# Patient Record
Sex: MALE | Race: WHITE | NOT HISPANIC OR LATINO | Employment: OTHER | URBAN - METROPOLITAN AREA
[De-identification: names, ages, dates, MRNs, and addresses within clinical notes are randomized per-mention and may not be internally consistent; named-entity substitution may affect disease eponyms.]

---

## 2019-03-11 ENCOUNTER — TELEPHONE (OUTPATIENT)
Dept: NEUROLOGY | Facility: CLINIC | Age: 79
End: 2019-03-11

## 2019-03-11 DIAGNOSIS — M54.16 LUMBAR RADICULOPATHY: Primary | ICD-10-CM

## 2019-03-15 ENCOUNTER — PROCEDURE VISIT (OUTPATIENT)
Dept: NEUROLOGY | Facility: CLINIC | Age: 79
End: 2019-03-15
Payer: MEDICARE

## 2019-03-15 DIAGNOSIS — M54.16 LUMBAR RADICULOPATHY: ICD-10-CM

## 2019-03-15 PROCEDURE — 95910 NRV CNDJ TEST 7-8 STUDIES: CPT | Mod: S$GLB,,, | Performed by: PSYCHIATRY & NEUROLOGY

## 2019-03-15 PROCEDURE — 95886 PR EMG COMPLETE, W/ NERVE CONDUCTION STUDIES, 5+ MUSCLES: ICD-10-PCS | Mod: S$GLB,,, | Performed by: PSYCHIATRY & NEUROLOGY

## 2019-03-15 PROCEDURE — 95910 PR NERVE CONDUCTION STUDY; 7-8 STUDIES: ICD-10-PCS | Mod: S$GLB,,, | Performed by: PSYCHIATRY & NEUROLOGY

## 2019-03-15 PROCEDURE — 95886 MUSC TEST DONE W/N TEST COMP: CPT | Mod: S$GLB,,, | Performed by: PSYCHIATRY & NEUROLOGY

## 2019-05-17 ENCOUNTER — TELEPHONE (OUTPATIENT)
Dept: PAIN MEDICINE | Facility: CLINIC | Age: 79
End: 2019-05-17

## 2019-05-17 NOTE — TELEPHONE ENCOUNTER
Called to confirm appointment with Dr. Shell on 5/23/19 @ 11:00 in pain management clinic.          Pt confirmed. Informed pt of IMP. Verified pt insurance. Informed pt of location and suite number.  Patient gave verbal understanding

## 2019-05-23 ENCOUNTER — HOSPITAL ENCOUNTER (OUTPATIENT)
Dept: RADIOLOGY | Facility: OTHER | Age: 79
Discharge: HOME | End: 2019-05-23
Attending: ANESTHESIOLOGY
Payer: MEDICARE

## 2019-05-23 ENCOUNTER — OFFICE VISIT (OUTPATIENT)
Dept: PAIN MEDICINE | Facility: CLINIC | Age: 79
End: 2019-05-23
Attending: ANESTHESIOLOGY
Payer: MEDICARE

## 2019-05-23 VITALS
BODY MASS INDEX: 31.39 KG/M2 | HEART RATE: 75 BPM | HEIGHT: 67 IN | WEIGHT: 200 LBS | RESPIRATION RATE: 18 BRPM | SYSTOLIC BLOOD PRESSURE: 114 MMHG | DIASTOLIC BLOOD PRESSURE: 75 MMHG | TEMPERATURE: 98 F

## 2019-05-23 DIAGNOSIS — M43.06 SPONDYLOLYSIS OF LUMBAR REGION: ICD-10-CM

## 2019-05-23 DIAGNOSIS — M54.15 RADICULOPATHY OF THORACOLUMBAR REGION: ICD-10-CM

## 2019-05-23 DIAGNOSIS — M51.36 DDD (DEGENERATIVE DISC DISEASE), LUMBAR: ICD-10-CM

## 2019-05-23 DIAGNOSIS — I25.10 CORONARY ARTERY DISEASE INVOLVING NATIVE HEART WITHOUT ANGINA PECTORIS, UNSPECIFIED VESSEL OR LESION TYPE: Chronic | ICD-10-CM

## 2019-05-23 DIAGNOSIS — M48.061 SPINAL STENOSIS OF LUMBAR REGION WITHOUT NEUROGENIC CLAUDICATION: ICD-10-CM

## 2019-05-23 DIAGNOSIS — E11.9 TYPE 2 DIABETES MELLITUS WITHOUT COMPLICATION, WITHOUT LONG-TERM CURRENT USE OF INSULIN: Chronic | ICD-10-CM

## 2019-05-23 DIAGNOSIS — M54.15 RADICULOPATHY OF THORACOLUMBAR REGION: Primary | ICD-10-CM

## 2019-05-23 PROBLEM — E78.5 HYPERLIPEMIA: Chronic | Status: ACTIVE | Noted: 2019-05-23

## 2019-05-23 PROBLEM — Z95.1 S/P CABG X 3: Status: ACTIVE | Noted: 2019-05-23

## 2019-05-23 PROBLEM — I10 ESSENTIAL HYPERTENSION: Chronic | Status: ACTIVE | Noted: 2019-05-23

## 2019-05-23 PROBLEM — I73.9 PERIPHERAL VASCULAR DISEASE: Status: ACTIVE | Noted: 2019-05-23

## 2019-05-23 PROBLEM — T82.211A: Status: ACTIVE | Noted: 2019-05-23

## 2019-05-23 PROCEDURE — 1101F PR PT FALLS ASSESS DOC 0-1 FALLS W/OUT INJ PAST YR: ICD-10-PCS | Mod: CPTII,S$GLB,, | Performed by: ANESTHESIOLOGY

## 2019-05-23 PROCEDURE — 99999 PR PBB SHADOW E&M-EST. PATIENT-LVL IV: ICD-10-PCS | Mod: PBBFAC,,, | Performed by: ANESTHESIOLOGY

## 2019-05-23 PROCEDURE — 99999 PR PBB SHADOW E&M-EST. PATIENT-LVL IV: CPT | Mod: PBBFAC,,, | Performed by: ANESTHESIOLOGY

## 2019-05-23 PROCEDURE — 1101F PT FALLS ASSESS-DOCD LE1/YR: CPT | Mod: CPTII,S$GLB,, | Performed by: ANESTHESIOLOGY

## 2019-05-23 PROCEDURE — 99204 OFFICE O/P NEW MOD 45 MIN: CPT | Mod: GC,S$GLB,, | Performed by: ANESTHESIOLOGY

## 2019-05-23 PROCEDURE — 99204 PR OFFICE/OUTPT VISIT, NEW, LEVL IV, 45-59 MIN: ICD-10-PCS | Mod: GC,S$GLB,, | Performed by: ANESTHESIOLOGY

## 2019-05-23 RX ORDER — TRAMADOL HYDROCHLORIDE 50 MG/1
TABLET ORAL
COMMUNITY
End: 2024-03-25

## 2019-05-23 RX ORDER — GLIMEPIRIDE 2 MG/1
1 TABLET ORAL
COMMUNITY
Start: 2019-02-04

## 2019-05-23 RX ORDER — EPLERENONE 25 MG/1
TABLET, FILM COATED ORAL
COMMUNITY
Start: 2019-02-04 | End: 2019-08-12 | Stop reason: SDUPTHER

## 2019-05-23 RX ORDER — LISINOPRIL 10 MG/1
TABLET ORAL
COMMUNITY
End: 2019-07-03

## 2019-05-23 RX ORDER — METOPROLOL TARTRATE 50 MG/1
TABLET ORAL
COMMUNITY
End: 2019-09-23 | Stop reason: DRUGHIGH

## 2019-05-23 RX ORDER — ASPIRIN 81 MG/1
81 TABLET ORAL
COMMUNITY
Start: 2011-01-06 | End: 2022-03-29

## 2019-05-23 RX ORDER — GABAPENTIN 300 MG/1
CAPSULE ORAL
COMMUNITY
End: 2023-04-06

## 2019-05-23 RX ORDER — FUROSEMIDE 40 MG/1
40 TABLET ORAL DAILY
COMMUNITY
End: 2024-03-25

## 2019-05-23 RX ORDER — LISINOPRIL 10 MG/1
10 TABLET ORAL
COMMUNITY
Start: 2007-10-05 | End: 2019-07-03

## 2019-05-23 RX ORDER — FLUTICASONE PROPIONATE 50 MCG
SPRAY, SUSPENSION (ML) NASAL
COMMUNITY
Start: 2017-07-17

## 2019-05-23 RX ORDER — EPLERENONE 25 MG/1
TABLET, FILM COATED ORAL
COMMUNITY
Start: 2019-04-21 | End: 2024-03-25

## 2019-05-23 RX ORDER — CLOPIDOGREL BISULFATE 75 MG/1
TABLET ORAL
COMMUNITY
Start: 2015-07-16

## 2019-05-23 RX ORDER — PRAVASTATIN SODIUM 80 MG/1
TABLET ORAL
COMMUNITY
Start: 2015-07-13 | End: 2019-09-23 | Stop reason: DRUGHIGH

## 2019-05-23 RX ORDER — POTASSIUM CHLORIDE 1500 MG/1
TABLET, EXTENDED RELEASE ORAL
COMMUNITY
Start: 2019-03-29 | End: 2019-07-03

## 2019-05-23 NOTE — PROGRESS NOTES
Chronic Pain - New Consult    Referring Physician: Yosef Connors     Chief Complaint:   Chief Complaint   Patient presents with    Low-back Pain    Hip Pain     bilateral        SUBJECTIVE:    HPI:  Will Kan presents to the clinic for the evaluation of lower back pain. The pain started 3 years ago following non-related imjury and symptoms have been worsening. More recently patient underwent angiogram in 02/2019 and the back pain got acutely worse after laying flat for the required amount of time. The pain is located in the lower back area and radiates to the bilateral hip, worse on the left. The pain is described as aching and numbing and is rated as 4/10. The pain is rated with a score of  4/10 on the BEST day and a score of 10/10 on the WORST day. Symptoms interfere with activities around the house such as yard work as well as exercise. The pain is exacerbated by bending, walking and getting out of bed/chair. The pain is mitigated by medications and rest. He reports spending 4 hours per day reclining. The patient reports 8 hours of uninterrupted sleep per night.    Patient denies night fever/night sweats, urinary incontinence, bowel incontinence, significant weight loss, significant motor weakness and loss of sensations.    Brief Hx:  Will Kan is a 78 y.o. male w/ a significant PMHx of HTN, HLD, PVD, CAD s/p 3v CABG in 2007, and T2DM. Patient presents to clinic for lower back pain that radiates down to the hip and is worse on the left side. On the left side pain shoots down the leg and stops at the knee.     Physical Therapy/Home Exercise: yes      Pain Disability Index Review:  Last 3 PDI Scores 5/23/2019   Pain Disability Index (PDI) 13       Pain Medications:  - Opioids: Ultram ER (Tramadol HCL)  - Adjuvant Medications: Neurontin (Gabapentin)  - Anti-Coagulants: Aspirin  - Others: see med list      report:  Reviewed and consistent with medication use as prescribed.    Pain Procedures:    None     Imaging:     EMG (03/15/19):  Acute on chronic denervation in bilateral L5 and/or S1 myotomes and chronic denervation in bilateral L4 and/or L5 through S1 and/or S2 myotomes consistent with radiculopathies at those levels.       No past medical history on file.  No past surgical history on file.  Social History     Socioeconomic History    Marital status:      Spouse name: Not on file    Number of children: Not on file    Years of education: Not on file    Highest education level: Not on file   Occupational History    Not on file   Social Needs    Financial resource strain: Not on file    Food insecurity:     Worry: Not on file     Inability: Not on file    Transportation needs:     Medical: Not on file     Non-medical: Not on file   Tobacco Use    Smoking status: Former Smoker    Smokeless tobacco: Never Used    Tobacco comment: 20 years ago   Substance and Sexual Activity    Alcohol use: Not on file    Drug use: Not on file    Sexual activity: Not on file   Lifestyle    Physical activity:     Days per week: Not on file     Minutes per session: Not on file    Stress: Not on file   Relationships    Social connections:     Talks on phone: Not on file     Gets together: Not on file     Attends [employer] service: Not on file     Active member of club or organization: Not on file     Attends meetings of clubs or organizations: Not on file     Relationship status: Not on file   Other Topics Concern    Not on file   Social History Narrative    Not on file     No family history on file.    Review of patient's allergies indicates:  No Known Allergies    Current Outpatient Medications   Medication Sig    aspirin (ASPIR-81) 81 MG EC tablet Take 81 mg by mouth.    clopidogrel (PLAVIX) 75 mg tablet clopidogrel 75 mg tablet    fluticasone propionate (FLONASE) 50 mcg/actuation nasal spray fluticasone propionate 50 mcg/actuation nasal spray,suspension    furosemide (LASIX) 40 MG tablet Take  "40 mg by mouth 2 (two) times daily.    glimepiride (AMARYL) 2 MG tablet glimepiride 2 mg tablet    pravastatin (PRAVACHOL) 80 MG tablet pravastatin 80 mg tablet    eplerenone (INSPRA) 25 MG Tab     eplerenone (INSPRA) 25 MG Tab eplerenone 25 mg tablet    gabapentin (NEURONTIN) 300 MG capsule gabapentin 300 mg capsule   TAKE 1 CAPSULE BY MOUTH THREE TIMES A DAY AS NEEDED    KLOR-CON M20 20 mEq tablet     lisinopril 10 MG tablet Take 10 mg by mouth.    lisinopril 10 MG tablet lisinopril 10 mg tablet    metoprolol tartrate (LOPRESSOR) 50 MG tablet metoprolol tartrate 50 mg tablet    traMADol (ULTRAM) 50 mg tablet tramadol 50 mg tablet     No current facility-administered medications for this visit.        REVIEW OF SYSTEMS:  GENERAL: No weight loss, malaise or fevers.  HEENT: Negative for frequent or significant headaches.  NECK: Negative for lumps, goiter, pain and significant neck swelling.  RESPIRATORY: Negative for cough, wheezing or shortness of breath.  CARDIOVASCULAR: Negative for chest pain, leg swelling or palpitations.  GI: Negative for abdominal discomfort, blood in stools or black stools or change in bowel habits.  MUSCULOSKELETAL: See HPI.  SKIN: Negative for lesions, rash, and itching.  PSYCH: +ve for sleep disturbance, mood disorder and recent psychosocial stressors.  HEMATOLOGY/LYMPHOLOGY: Negative for prolonged bleeding, bruising easily or swollen nodes.  NEURO: No history of headaches, syncope, paralysis, seizures or tremors.  All other reviewed and negative other than HPI.    OBJECTIVE:    /75   Pulse 75   Temp 97.6 °F (36.4 °C)   Resp 18   Ht 5' 7" (1.702 m)   Wt 90.7 kg (200 lb)   BMI 31.32 kg/m²     PHYSICAL EXAMINATION:  General appearance: Well appearing, in no acute distress, alert and oriented x3.  Psych:  Mood and affect appropriate.  Skin: Skin color, texture, turgor normal, no rashes or lesions, in both upper and lower body.  Head/face:  Normocephalic, atraumatic. No " palpable lymph nodes.  Neck: No pain to palpation over the cervical paraspinous muscles. Spurling Negative. No pain with neck flexion, extension, or lateral flexion.   Cor: RRR  Pulm: CTA  GI: Soft and non-tender.  Back: Straight leg raising in the sitting and supine positions is positive to radicular pain in left lower extremity. Positive facet loading on left side. Moderate pain to palpation over the spine or costovertebral angles. Normal range of motion without pain reproduction.  Extremities: Peripheral joint ROM is full and pain free without obvious instability or laxity in all four extremities. No deformities, edema, or skin discoloration. Good capillary refill.  Musculoskeletal: Shoulder, hip, sacroiliac and knee provocative maneuvers are negative. Bilateral upper and lower extremity strength is normal and symmetric. No atrophy or tone abnormalities are noted.  Neuro: Bilateral upper and lower extremity coordination and muscle stretch reflexes are physiologic and symmetric. Plantar response are downgoing. No loss of sensation is noted.  Gait: antalgic    ASSESSMENT: 78 y.o. year old male with pain, consistent with     1. Lumbar degenerative disc disease     2. Essential hypertension     3. Coronary artery disease involving native heart without angina pectoris, unspecified vessel or lesion type     4. Hyperlipidemia, unspecified hyperlipidemia type     5. Type 2 diabetes mellitus without complication, without long-term current use of insulin     6. S/P CABG x 3     7. Peripheral vascular disease     8. Spinal stenosis of lumbar region without neurogenic claudication     9. DDD (degenerative disc disease), lumbar     10. Spondylolysis of lumbar region         PLAN:     - I have stressed the importance of physical activity and a home exercise plan to help with pain and improve health.  - Start Neurontin 300 mg gradually to three times a day to help with radicular pain.  -schedule for TFESI L3-4  - RTC in 2 weeks  or sooner if needed.  - Counseled patient regarding the importance of activity modification, constant sleeping habits and physical therapy.    The above plan and management options were discussed at length with patient. Patient is in agreement with the above and verbalized understanding. It will be communicated with the referring physician via electronic record, fax, or mail.    Simon Larson I have personally reviewed the history and exam of this patient and agree with the resident/fellow/NPs note as stated above.    Jason Shell MD    05/23/2019

## 2019-05-24 ENCOUNTER — HOSPITAL ENCOUNTER (OUTPATIENT)
Dept: RADIOLOGY | Facility: HOSPITAL | Age: 79
Discharge: HOME | End: 2019-05-24
Attending: ANESTHESIOLOGY
Payer: MEDICARE

## 2019-05-24 PROCEDURE — 72110 X-RAY EXAM L-2 SPINE 4/>VWS: CPT | Mod: 26,,, | Performed by: RADIOLOGY

## 2019-05-24 PROCEDURE — 72110 XR LUMBAR SPINE 5 VIEW WITH FLEX AND EXT: ICD-10-PCS | Mod: 26,,, | Performed by: RADIOLOGY

## 2019-05-24 PROCEDURE — 72110 X-RAY EXAM L-2 SPINE 4/>VWS: CPT | Mod: TC,FY

## 2019-05-24 PROCEDURE — 73521 X-RAY EXAM HIPS BI 2 VIEWS: CPT | Mod: TC,FY

## 2019-05-24 PROCEDURE — 73521 X-RAY EXAM HIPS BI 2 VIEWS: CPT | Mod: 26,,, | Performed by: RADIOLOGY

## 2019-05-24 PROCEDURE — 73521 XR HIPS BILATERAL 2 VIEW INCL AP PELVIS: ICD-10-PCS | Mod: 26,,, | Performed by: RADIOLOGY

## 2019-06-03 ENCOUNTER — TELEPHONE (OUTPATIENT)
Dept: PAIN MEDICINE | Facility: CLINIC | Age: 79
End: 2019-06-03

## 2019-06-03 NOTE — TELEPHONE ENCOUNTER
----- Message from Racquel Brizuela sent at 6/3/2019 10:13 AM CDT -----  Pt. Will like a call back to see what's going on regarding his hip Xray, please call pt.

## 2019-06-04 DIAGNOSIS — M48.50XA VERTEBRAL COMPRESSION FRACTURE: Primary | ICD-10-CM

## 2019-06-12 ENCOUNTER — TELEPHONE (OUTPATIENT)
Dept: PAIN MEDICINE | Facility: CLINIC | Age: 79
End: 2019-06-12

## 2019-06-12 ENCOUNTER — PATIENT MESSAGE (OUTPATIENT)
Dept: PAIN MEDICINE | Facility: CLINIC | Age: 79
End: 2019-06-12

## 2019-06-12 NOTE — TELEPHONE ENCOUNTER
----- Message from Taqueria Hector sent at 6/12/2019  1:54 PM CDT -----  Contact: TACOS MYERS [[id number]]  Type:  Patient Returning Call    Who Called: Brittaney Duran MA          Who Left Message for Patient: Brittaney Duran MA          Does the patient know what this is regarding?Procedure    Best Call Back Number:[phone]    Additional Information:

## 2019-06-12 NOTE — TELEPHONE ENCOUNTER
Called pt to follow up in regards to numbness and weakness in foot after injection and to reschedule follow up appt after procedure due to him pushing previous procedure appointment back to 6/19/19.    Pt requested for staff to call him back as he is informed staff he is in the grocery store at the moment.

## 2019-06-12 NOTE — TELEPHONE ENCOUNTER
----- Message from Lucero Garcia RN sent at 6/12/2019 11:28 AM CDT -----  Pt R/S procedure to next week 6/19, but he did want to let Dr Shell know that since his last injection he has been having numbness and weakness in his right foot. He stated if anyone needs to talk to him further about please give him a call. Thanks

## 2019-06-12 NOTE — TELEPHONE ENCOUNTER
Called and spoke with pt regarding him rescheduling his procedure for 6/1/9/19.    Staff explained to pt that follow up visit will need to be rescheduled to two weeks after procedure.     Pt rescheduled to 7/3/19 @ 11:00am with Any Orgeron.     Pt then informed staff that he his experiencing tingling and numbness in his ankle and foot since his last visit. Pt informed it was not related to injury, only cut gras the day before and the pain is primarily at night.     Staff informed pt that regards will be sent to Dr. Shell and will follow up upon his response.       Pt gave verbal understanding.

## 2019-06-19 ENCOUNTER — HOSPITAL ENCOUNTER (OUTPATIENT)
Facility: OTHER | Age: 79
Discharge: HOME | End: 2019-06-19
Attending: ANESTHESIOLOGY | Admitting: ANESTHESIOLOGY
Payer: MEDICARE

## 2019-06-19 VITALS
HEART RATE: 72 BPM | SYSTOLIC BLOOD PRESSURE: 142 MMHG | HEIGHT: 67 IN | DIASTOLIC BLOOD PRESSURE: 61 MMHG | WEIGHT: 196 LBS | OXYGEN SATURATION: 96 % | BODY MASS INDEX: 30.76 KG/M2 | TEMPERATURE: 98 F | RESPIRATION RATE: 18 BRPM

## 2019-06-19 DIAGNOSIS — M51.36 LUMBAR DEGENERATIVE DISC DISEASE: Primary | ICD-10-CM

## 2019-06-19 DIAGNOSIS — G89.29 CHRONIC PAIN: ICD-10-CM

## 2019-06-19 LAB — POCT GLUCOSE: 100 MG/DL (ref 70–110)

## 2019-06-19 PROCEDURE — 64483 NJX AA&/STRD TFRM EPI L/S 1: CPT | Mod: 50,,, | Performed by: ANESTHESIOLOGY

## 2019-06-19 PROCEDURE — 25500020 PHARM REV CODE 255: Performed by: ANESTHESIOLOGY

## 2019-06-19 PROCEDURE — 63600175 PHARM REV CODE 636 W HCPCS: Performed by: ANESTHESIOLOGY

## 2019-06-19 PROCEDURE — 25000003 PHARM REV CODE 250: Performed by: STUDENT IN AN ORGANIZED HEALTH CARE EDUCATION/TRAINING PROGRAM

## 2019-06-19 PROCEDURE — 25000003 PHARM REV CODE 250: Performed by: ANESTHESIOLOGY

## 2019-06-19 PROCEDURE — 99152 MOD SED SAME PHYS/QHP 5/>YRS: CPT | Mod: ,,, | Performed by: ANESTHESIOLOGY

## 2019-06-19 PROCEDURE — 64483 NJX AA&/STRD TFRM EPI L/S 1: CPT | Mod: 50 | Performed by: ANESTHESIOLOGY

## 2019-06-19 PROCEDURE — 64483 PR EPIDURAL INJ, ANES/STEROID, TRANSFORAMINAL, LUMB/SACR, SNGL LEVL: ICD-10-PCS | Mod: 50,,, | Performed by: ANESTHESIOLOGY

## 2019-06-19 PROCEDURE — 99152 PR MOD CONSCIOUS SEDATION, SAME PHYS, 5+ YRS, FIRST 15 MIN: ICD-10-PCS | Mod: ,,, | Performed by: ANESTHESIOLOGY

## 2019-06-19 RX ORDER — MIDAZOLAM HYDROCHLORIDE 1 MG/ML
INJECTION INTRAMUSCULAR; INTRAVENOUS
Status: DISCONTINUED | OUTPATIENT
Start: 2019-06-19 | End: 2019-06-19 | Stop reason: HOSPADM

## 2019-06-19 RX ORDER — LIDOCAINE HYDROCHLORIDE 10 MG/ML
INJECTION INFILTRATION; PERINEURAL
Status: DISCONTINUED | OUTPATIENT
Start: 2019-06-19 | End: 2019-06-19 | Stop reason: HOSPADM

## 2019-06-19 RX ORDER — SODIUM CHLORIDE 9 MG/ML
500 INJECTION, SOLUTION INTRAVENOUS CONTINUOUS
Status: DISCONTINUED | OUTPATIENT
Start: 2019-06-19 | End: 2019-06-19 | Stop reason: HOSPADM

## 2019-06-19 RX ORDER — LIDOCAINE HYDROCHLORIDE 5 MG/ML
INJECTION, SOLUTION INFILTRATION; INTRAVENOUS
Status: DISCONTINUED | OUTPATIENT
Start: 2019-06-19 | End: 2019-06-19 | Stop reason: HOSPADM

## 2019-06-19 RX ORDER — DEXAMETHASONE SODIUM PHOSPHATE 10 MG/ML
INJECTION INTRAMUSCULAR; INTRAVENOUS
Status: DISCONTINUED | OUTPATIENT
Start: 2019-06-19 | End: 2019-06-19 | Stop reason: HOSPADM

## 2019-06-19 RX ORDER — FENTANYL CITRATE 50 UG/ML
INJECTION, SOLUTION INTRAMUSCULAR; INTRAVENOUS
Status: DISCONTINUED | OUTPATIENT
Start: 2019-06-19 | End: 2019-06-19 | Stop reason: HOSPADM

## 2019-06-19 RX ORDER — SODIUM CHLORIDE 9 MG/ML
500 INJECTION, SOLUTION INTRAVENOUS CONTINUOUS
Status: ACTIVE | OUTPATIENT
Start: 2019-06-19

## 2019-06-19 RX ADMIN — SODIUM CHLORIDE 500 ML: 0.9 INJECTION, SOLUTION INTRAVENOUS at 09:06

## 2019-06-19 NOTE — OP NOTE
Patient Name: Will Kan  MRN: [id number]    INFORMED CONSENT: The procedure, risks, benefits and options were discussed with patient. There are no contraindications to the procedure. The patient expressed understanding and agreed to proceed. The personnel performing the procedure was discussed. I verify that I personally obtained Will's consent prior to the start of the procedure and the signed consent can be found on the patient's chart.    Procedure Date: 06/19/2019    Anesthesia: Topical    Pre Procedure diagnosis: Lumbar radiculopathy [M54.16]  1. Lumbar degenerative disc disease    2. Chronic pain      Post-Procedure diagnosis: SAME      Sedation: Yes - Fentanyl 50 mcg and Midazolam 2 mg    PROCEDURE:Bilateral L3-4   TRANSFORAMINAL EPIDURAL STEROID INJECTION    DESCRIPTION OF PROCEDURE: The patient was brought to the procedure room. After performing time out IV access was obtained prior to the procedure. The patient was positioned prone on the fluoroscopy table. Continuous hemodynamic monitoring was initiated including blood pressure, EKG, and pulse oximetry. . The skin was prepped with chlorhexidine three times and draped in a sterile fashion. Skin anesthesia was achieved using 3 mL of lidocaine 1% over the respective injection site.     An oblique fluoroscopic view was obtained, with the superior articular process of the inferior vertebral body aligned with the pedicle. The tip of a 22-gauge 3.5-inch Quincke-type spinal needle was advanced toward the 6 oclock position of the pedicle under intermittent fluoroscopic guidance. Confirmation of proper needle position was made with AP, oblique, and lateral fluoroscopic views. Negative aspiration for blood or CSF was confirmed. 2 mL of Omnipaque 300 was injected. Live fluoroscopic imaging revealed a clear outline of the spinal nerve with proximal spread of agent through the neural foramen into the anterior epidural space. A total combination of 3 mL of  Lidocaine 0.5% and 10 mg decadron was injected at each level. Contrast spread was noted from L2 to L4 level. There was no pain on injection. The needle was removed and bleeding was nil.  A sterile dressing was applied. Will was taken back to the recovery room for further observation.     Blood Loss: Nill  Specimen: None    Jason Shell MD

## 2019-06-19 NOTE — H&P
"HPI  Patient presenting for  Procedure(s) (LRB):  INJECTION, STEROID, EPIDURAL, TRANSFORAMINAL APPROACH, L3-L4 need consent Plavix (Bilateral) Last day of plavix 6/12/19    No health changes since previous encounter    PMHx, PSHx, Allergies, Medications reviewed in epic    ROS negative except pain complaints in HPI    OBJECTIVE:    BP (!) 184/83 (BP Location: Right arm, Patient Position: Sitting)   Pulse 83   Temp 98.3 °F (36.8 °C) (Oral)   Ht 5' 7" (1.702 m)   Wt 88.9 kg (196 lb)   SpO2 98%   BMI 30.70 kg/m²     PHYSICAL EXAMINATION:    GENERAL: Well appearing, in no acute distress, alert and oriented x3.  PSYCH:  Mood and affect appropriate.  SKIN: Skin color, texture, turgor normal, no rashes or lesions.  CV: RRR with palpation of the radial artery.  PULM: No evidence of respiratory difficulty, symmetric chest rise. Clear to auscultation.  NEURO: Cranial nerves grossly intact.    Plan:    Proceed with procedure as planned    Yamilex Schmidt  06/19/2019  "

## 2019-06-19 NOTE — DISCHARGE INSTRUCTIONS
Adult Procedural Sedation Instructions    Recovery After Procedural Sedation (Adult)  You have been given medicine by vein to make you sleep during your surgery. This may have included both a pain medicine and sleeping medicine. Most of the effects have worn off. But you may still have some drowsiness for the next 6 to 8 hours.  Home care  Follow these guidelines when you get home:  · For the next 8 hours, you should be watched by a responsible adult. This person should make sure your condition is not getting worse.  · Don't drink any alcohol for the next 24 hours.  · Don't drive, operate dangerous machinery, or make important business or personal decisions during the next 24 hours.  Note: Your healthcare provider may tell you not to take any medicine by mouth for pain or sleep in the next 4 hours. These medicines may react with the medicines you were given in the hospital. This could cause a much stronger response than usual.  Follow-up care  Follow up with your healthcare provider if you are not alert and back to your usual level of activity within 12 hours.  When to seek medical advice  Call your healthcare provider right away if any of these occur:  · Drowsiness gets worse  · Weakness or dizziness gets worse  · Repeated vomiting  · You can't be awakened   Date Last Reviewed: 10/18/2016  © 4497-4193 [employer]. [address] [place] [address], [place] [address]. All rights reserved. This information is not intended as a substitute for professional medical care. Always follow your healthcare professional's instructions.       Thank you for allowing us to care for you today. You may receive a survey about the care we provided. Your feedback is valuable and helps us provide excellent care throughout the community.     Home Care Instructions for Pain Management:    1. DIET:   You may resume your normal diet today.   2. BATHING:   You may shower with luke warm water. No tub baths or anything that will soak  injection sites under water for the next 24 hours.  3. DRESSING:   You may remove your bandage today.   4. ACTIVITY LEVEL:   You may resume your normal activities 24 hrs after your procedure. Nothing strenuous today.  5. MEDICATIONS:   You may resume your normal medications today. To restart blood thinners, ask your doctor.  6. DRIVING    If you have received any sedatives by mouth today, you may not drive for 12 hours.    If you have received any sedation through your IV, you may not drive for 24 hrs.   7. SPECIAL INSTRUCTIONS:   No heat to the injection site for 24 hrs including, hot bath or shower, heating pad, moist heat, or hot tubs.    Use ice pack to injection site for any pain or discomfort.  Apply ice packs for 20 minute intervals as needed.    IF you have diabetes, be sure to monitor your blood sugar more closely. IF your injection contained steroids your blood sugar levels may become higher than normal.    If you are still having pain upon discharge:  Your pain may improve over the next 48 hours. The anesthetic (numbing medication) works immediately to 48 hours. IF your injection contained a steroid (anti-inflammatory medication), it takes approximately 3 days to start feeling relief and 7-10 days to see your greatest results from the medication. It is possible you may need subsequent injections. This would be discussed at your follow up appointment with pain management or your referring doctor.      PLEASE CALL YOUR DOCTOR IF:  1. Redness or swelling around the injection site.  2. Fever of 101 degrees or more  3. Drainage (pus) from the injection site.  4. For any continuous bleeding (some dried blood over the incision is normal.)    FOR EMERGENCIES:   If any unusual problems or difficulties occur during clinic hours, call [phone] or [phone].

## 2019-06-24 NOTE — DISCHARGE SUMMARY
Discharge Note  Short Stay      SUMMARY     Admit Date: 6/19/2019    Attending Physician: Jason Shell      Discharge Physician: Jason Shell      Discharge Date: 6/19/2019     Procedure(s) (LRB):  INJECTION, STEROID, EPIDURAL, TRANSFORAMINAL APPROACH, L3-L4 need consent Plavix (Bilateral)    Final Diagnosis: Lumbar radiculopathy [M54.16]    Disposition: Home or self care    Patient Instructions:   Discharge Medication List as of 6/19/2019  9:49 AM      CONTINUE these medications which have NOT CHANGED    Details   aspirin (ASPIR-81) 81 MG EC tablet Take 81 mg by mouth., Starting Thu 1/6/2011, Historical Med      clopidogrel (PLAVIX) 75 mg tablet clopidogrel 75 mg tablet, Historical Med      !! eplerenone (INSPRA) 25 MG Tab Starting Sun 4/21/2019, Historical Med      !! eplerenone (INSPRA) 25 MG Tab eplerenone 25 mg tablet, Historical Med      fluticasone propionate (FLONASE) 50 mcg/actuation nasal spray fluticasone propionate 50 mcg/actuation nasal spray,suspension, Historical Med      furosemide (LASIX) 40 MG tablet Take 40 mg by mouth 2 (two) times daily., Historical Med      gabapentin (NEURONTIN) 300 MG capsule gabapentin 300 mg capsule   TAKE 1 CAPSULE BY MOUTH THREE TIMES A DAY AS NEEDED, Historical Med      glimepiride (AMARYL) 2 MG tablet glimepiride 2 mg tablet, Historical Med      KLOR-CON M20 20 mEq tablet Starting Fri 3/29/2019, Historical Med      !! lisinopril 10 MG tablet Take 10 mg by mouth., Starting Fri 10/5/2007, Historical Med      !! lisinopril 10 MG tablet lisinopril 10 mg tablet, Historical Med      metoprolol tartrate (LOPRESSOR) 50 MG tablet metoprolol tartrate 50 mg tablet, Historical Med      pravastatin (PRAVACHOL) 80 MG tablet pravastatin 80 mg tablet, Historical Med      traMADol (ULTRAM) 50 mg tablet tramadol 50 mg tablet, Historical Med       !! - Potential duplicate medications found. Please discuss with provider.              Discharge Diagnosis: Lumbar radiculopathy  [M54.16]  Condition on Discharge: Stable with no complications to procedure   Diet on Discharge: Same as before.  Activity: as per instruction sheet.  Discharge to: Home with a responsible adult.  Follow up: 2-4 weeks

## 2019-07-03 ENCOUNTER — OFFICE VISIT (OUTPATIENT)
Dept: PAIN MEDICINE | Facility: CLINIC | Age: 79
End: 2019-07-03
Payer: MEDICARE

## 2019-07-03 VITALS
TEMPERATURE: 98 F | WEIGHT: 194.69 LBS | HEART RATE: 86 BPM | DIASTOLIC BLOOD PRESSURE: 77 MMHG | BODY MASS INDEX: 30.56 KG/M2 | HEIGHT: 67 IN | SYSTOLIC BLOOD PRESSURE: 140 MMHG

## 2019-07-03 DIAGNOSIS — M48.061 SPINAL STENOSIS OF LUMBAR REGION WITHOUT NEUROGENIC CLAUDICATION: ICD-10-CM

## 2019-07-03 DIAGNOSIS — M47.816 FACET ARTHRITIS OF LUMBAR REGION: ICD-10-CM

## 2019-07-03 DIAGNOSIS — M47.26 OSTEOARTHRITIS OF SPINE WITH RADICULOPATHY, LUMBAR REGION: ICD-10-CM

## 2019-07-03 DIAGNOSIS — M54.16 LUMBAR RADICULOPATHY: Primary | ICD-10-CM

## 2019-07-03 DIAGNOSIS — M51.36 DDD (DEGENERATIVE DISC DISEASE), LUMBAR: ICD-10-CM

## 2019-07-03 PROCEDURE — 99213 PR OFFICE/OUTPT VISIT, EST, LEVL III, 20-29 MIN: ICD-10-PCS | Mod: S$GLB,,, | Performed by: NURSE PRACTITIONER

## 2019-07-03 PROCEDURE — 1101F PR PT FALLS ASSESS DOC 0-1 FALLS W/OUT INJ PAST YR: ICD-10-PCS | Mod: CPTII,S$GLB,, | Performed by: NURSE PRACTITIONER

## 2019-07-03 PROCEDURE — 1101F PT FALLS ASSESS-DOCD LE1/YR: CPT | Mod: CPTII,S$GLB,, | Performed by: NURSE PRACTITIONER

## 2019-07-03 PROCEDURE — 99999 PR PBB SHADOW E&M-EST. PATIENT-LVL III: ICD-10-PCS | Mod: PBBFAC,,, | Performed by: NURSE PRACTITIONER

## 2019-07-03 PROCEDURE — 99999 PR PBB SHADOW E&M-EST. PATIENT-LVL III: CPT | Mod: PBBFAC,,, | Performed by: NURSE PRACTITIONER

## 2019-07-03 PROCEDURE — 99213 OFFICE O/P EST LOW 20 MIN: CPT | Mod: S$GLB,,, | Performed by: NURSE PRACTITIONER

## 2019-07-03 RX ORDER — CILOSTAZOL 100 MG/1
TABLET ORAL
COMMUNITY
Start: 2019-06-24 | End: 2019-08-12

## 2019-07-03 NOTE — PROGRESS NOTES
Chronic Pain - Established Visit    Referring Physician: No ref. provider found     Chief Complaint:   Chief Complaint   Patient presents with    Follow-up     2 week after injection        SUBJECTIVE:    Interval History 7/3/2019:  The patient returns to clinic today for follow up. He is s/p bilateral L3 TF TRIP on 6/19/2019. He reports significant relief of his back pain. He continues to report pain that radiates into the front and side of his left leg to his knee. He denies any right radicular leg pain. He does report burning, tingling, and numbness to the top of his right foot. His pain is worse with prolonged activity and at night. He continues to take Gabapentin with benefit. He denies any other health changes. His pain today is 5/10.    HPI:  Will Kan presents to the clinic for the evaluation of lower back pain. The pain started 3 years ago following non-related imjury and symptoms have been worsening. More recently patient underwent angiogram in 02/2019 and the back pain got acutely worse after laying flat for the required amount of time. The pain is located in the lower back area and radiates to the bilateral hip, worse on the left. The pain is described as aching and numbing and is rated as 4/10. The pain is rated with a score of  4/10 on the BEST day and a score of 10/10 on the WORST day. Symptoms interfere with activities around the house such as yard work as well as exercise. The pain is exacerbated by bending, walking and getting out of bed/chair. The pain is mitigated by medications and rest. He reports spending 4 hours per day reclining. The patient reports 8 hours of uninterrupted sleep per night.    Patient denies night fever/night sweats, urinary incontinence, bowel incontinence, significant weight loss, significant motor weakness and loss of sensations.    Brief Hx:  Will Kan is a 78 y.o. male w/ a significant PMHx of HTN, HLD, PVD, CAD s/p 3v CABG in 2007, and T2DM. Patient  presents to clinic for lower back pain that radiates down to the hip and is worse on the left side. On the left side pain shoots down the leg and stops at the knee.     Physical Therapy/Home Exercise: yes      Pain Disability Index Review:  Last 3 PDI Scores 7/3/2019 5/23/2019   Pain Disability Index (PDI) 5 13       Pain Medications:  - Opioids: Ultram ER (Tramadol HCL)  - Adjuvant Medications: Neurontin (Gabapentin)  - Anti-Coagulants: Aspirin  - Others: see med list      report:  Reviewed and consistent with medication use as prescribed.    Pain Procedures:   6/19/2019- Bilateral L3-4 TF TRIP    Imaging:     EMG (03/15/19):  Acute on chronic denervation in bilateral L5 and/or S1 myotomes and chronic denervation in bilateral L4 and/or L5 through S1 and/or S2 myotomes consistent with radiculopathies at those levels.     Outside MRI available in media.    Past Medical History:   Diagnosis Date    CHF (congestive heart failure)     Diabetes mellitus      Past Surgical History:   Procedure Laterality Date    INJECTION, STEROID, EPIDURAL, TRANSFORAMINAL APPROACH, L3-L4 need consent Plavix Bilateral 6/19/2019    Performed by Jason Shell MD at [facility]     Social History     Socioeconomic History    Marital status:      Spouse name: Not on file    Number of children: Not on file    Years of education: Not on file    Highest education level: Not on file   Occupational History    Not on file   Social Needs    Financial resource strain: Not on file    Food insecurity:     Worry: Not on file     Inability: Not on file    Transportation needs:     Medical: Not on file     Non-medical: Not on file   Tobacco Use    Smoking status: Former Smoker    Smokeless tobacco: Never Used    Tobacco comment: 20 years ago   Substance and Sexual Activity    Alcohol use: Not on file    Drug use: Not on file    Sexual activity: Not on file   Lifestyle    Physical activity:     Days per week: Not on file      Minutes per session: Not on file    Stress: Not on file   Relationships    Social connections:     Talks on phone: Not on file     Gets together: Not on file     Attends Protestant service: Not on file     Active member of club or organization: Not on file     Attends meetings of clubs or organizations: Not on file     Relationship status: Not on file   Other Topics Concern    Not on file   Social History Narrative    Not on file     History reviewed. No pertinent family history.    Review of patient's allergies indicates:  No Known Allergies    Current Outpatient Medications   Medication Sig    aspirin (ASPIR-81) 81 MG EC tablet Take 81 mg by mouth.    cilostazol (PLETAL) 100 MG Tab     clopidogrel (PLAVIX) 75 mg tablet clopidogrel 75 mg tablet    eplerenone (INSPRA) 25 MG Tab     eplerenone (INSPRA) 25 MG Tab eplerenone 25 mg tablet    fluticasone propionate (FLONASE) 50 mcg/actuation nasal spray fluticasone propionate 50 mcg/actuation nasal spray,suspension    furosemide (LASIX) 40 MG tablet Take 40 mg by mouth 2 (two) times daily.    gabapentin (NEURONTIN) 300 MG capsule gabapentin 300 mg capsule   TAKE 1 CAPSULE BY MOUTH THREE TIMES A DAY AS NEEDED    glimepiride (AMARYL) 2 MG tablet glimepiride 2 mg tablet    metoprolol tartrate (LOPRESSOR) 50 MG tablet metoprolol tartrate 50 mg tablet    pravastatin (PRAVACHOL) 80 MG tablet pravastatin 80 mg tablet    traMADol (ULTRAM) 50 mg tablet tramadol 50 mg tablet    KLOR-CON M20 20 mEq tablet     lisinopril 10 MG tablet Take 10 mg by mouth.    lisinopril 10 MG tablet lisinopril 10 mg tablet     No current facility-administered medications for this visit.      Facility-Administered Medications Ordered in Other Visits   Medication    0.9%  NaCl infusion       REVIEW OF SYSTEMS:  GENERAL: No weight loss, malaise or fevers.  HEENT: Negative for frequent or significant headaches.  NECK: Negative for lumps, goiter, pain and significant neck  "swelling.  RESPIRATORY: Negative for cough, wheezing or shortness of breath.  CARDIOVASCULAR: Negative for chest pain, leg swelling or palpitations. CAD, hx of CABG  GI: Negative for abdominal discomfort, blood in stools or black stools or change in bowel habits.  MUSCULOSKELETAL: See HPI.  SKIN: Negative for lesions, rash, and itching.  PSYCH: +ve for sleep disturbance, mood disorder and recent psychosocial stressors.  HEMATOLOGY/LYMPHOLOGY: Negative for swollen nodes. Plavix  NEURO: No history of headaches, syncope, paralysis, seizures or tremors.  All other reviewed and negative other than HPI.    OBJECTIVE:    BP (!) 140/77   Pulse 86   Temp 98.1 °F (36.7 °C)   Ht 5' 7" (1.702 m)   Wt 88.3 kg (194 lb 10.7 oz)   BMI 30.49 kg/m²     PHYSICAL EXAMINATION:  General appearance: Well appearing, in no acute distress, alert and oriented x3.  Psych:  Mood and affect appropriate.  Skin: Skin color, texture, turgor normal, no rashes or lesions, in both upper and lower body.  Head/face:  Normocephalic, atraumatic. No palpable lymph nodes.  Cor: RRR  Pulm: CTA  GI: Soft and non-tender.  Back: Straight leg raising in the sitting positions is positive to radicular pain on the left, negative on the right. Mild pain with palpation over lumbar spine. Limited ROM with pain on flexion and extension. Positive facet loading to the left.   Extremities: Peripheral joint ROM is full and pain free without obvious instability or laxity in all four extremities. No deformities, edema, or skin discoloration. Good capillary refill.  Musculoskeletal: Shoulder, hip, sacroiliac and knee provocative maneuvers are negative. Bilateral lower extremity strength is normal and symmetric. No atrophy or tone abnormalities are noted.  Neuro: Bilateral lower extremity coordination and muscle stretch reflexes are physiologic and symmetric. Plantar response are downgoing. No loss of sensation is noted.  Gait: Antalgic- ambulates without assistance. "     ASSESSMENT: 78 y.o. year old male with pain, consistent with     1. Lumbar radiculopathy     2. Spinal stenosis of lumbar region without neurogenic claudication     3. Osteoarthritis of spine with radiculopathy, lumbar region     4. Facet arthritis of lumbar region     5. DDD (degenerative disc disease), lumbar         PLAN:     - Previous imaging was reviewed and discussed with the patient today.    - Schedule for bilateral L4-5 TF TRIP.   The procedure, risks, benefits and options were discussed with patient. There are no contraindications to the procedure. The patient expressed understanding and agreed to proceed.  Consent obtained today.    - Consider lumbar medial branch blocks in the future.     - Continue Gabapentin.     - I have stressed the importance of physical activity and a home exercise plan to help with pain and improve health.    - RTC 2 weeks after above procedure.     - Counseled patient regarding the importance of activity modification, constant sleeping habits and physical therapy.    - Dr. Shell was consulted on the patient and agrees with this plan.    Virginia Morejon NP  07/03/2019

## 2019-07-09 ENCOUNTER — PATIENT MESSAGE (OUTPATIENT)
Dept: PAIN MEDICINE | Facility: CLINIC | Age: 79
End: 2019-07-09

## 2019-07-26 ENCOUNTER — TELEPHONE (OUTPATIENT)
Dept: PAIN MEDICINE | Facility: CLINIC | Age: 79
End: 2019-07-26

## 2019-07-26 NOTE — TELEPHONE ENCOUNTER
----- Message from Racquel Brizuela sent at 7/26/2019  3:46 PM CDT -----  Pt. scheduled with Dr. Shell 7/31/19, pt. Requesting a copy of his x-rays a couple of weeks ago.

## 2019-07-31 ENCOUNTER — HOSPITAL ENCOUNTER (OUTPATIENT)
Facility: OTHER | Age: 79
Discharge: HOME | End: 2019-07-31
Attending: ANESTHESIOLOGY | Admitting: ANESTHESIOLOGY
Payer: MEDICARE

## 2019-07-31 VITALS
BODY MASS INDEX: 29.19 KG/M2 | RESPIRATION RATE: 16 BRPM | HEART RATE: 81 BPM | TEMPERATURE: 98 F | OXYGEN SATURATION: 98 % | DIASTOLIC BLOOD PRESSURE: 67 MMHG | WEIGHT: 186 LBS | SYSTOLIC BLOOD PRESSURE: 148 MMHG | HEIGHT: 67 IN

## 2019-07-31 DIAGNOSIS — M54.16 LUMBAR RADICULOPATHY: ICD-10-CM

## 2019-07-31 DIAGNOSIS — M51.36 DDD (DEGENERATIVE DISC DISEASE), LUMBAR: ICD-10-CM

## 2019-07-31 DIAGNOSIS — M51.36 LUMBAR DEGENERATIVE DISC DISEASE: Primary | ICD-10-CM

## 2019-07-31 LAB — POCT GLUCOSE: 108 MG/DL (ref 70–110)

## 2019-07-31 PROCEDURE — 64483 PR EPIDURAL INJ, ANES/STEROID, TRANSFORAMINAL, LUMB/SACR, SNGL LEVL: ICD-10-PCS | Mod: 50,,, | Performed by: ANESTHESIOLOGY

## 2019-07-31 PROCEDURE — 25500020 PHARM REV CODE 255: Performed by: ANESTHESIOLOGY

## 2019-07-31 PROCEDURE — 25000003 PHARM REV CODE 250: Performed by: ANESTHESIOLOGY

## 2019-07-31 PROCEDURE — 64483 NJX AA&/STRD TFRM EPI L/S 1: CPT | Mod: 50,,, | Performed by: ANESTHESIOLOGY

## 2019-07-31 PROCEDURE — 64483 NJX AA&/STRD TFRM EPI L/S 1: CPT | Mod: 50 | Performed by: ANESTHESIOLOGY

## 2019-07-31 PROCEDURE — 99152 MOD SED SAME PHYS/QHP 5/>YRS: CPT | Mod: ,,, | Performed by: ANESTHESIOLOGY

## 2019-07-31 PROCEDURE — 99152 PR MOD CONSCIOUS SEDATION, SAME PHYS, 5+ YRS, FIRST 15 MIN: ICD-10-PCS | Mod: ,,, | Performed by: ANESTHESIOLOGY

## 2019-07-31 PROCEDURE — 63600175 PHARM REV CODE 636 W HCPCS: Performed by: GENERAL PRACTICE

## 2019-07-31 PROCEDURE — 63600175 PHARM REV CODE 636 W HCPCS: Performed by: ANESTHESIOLOGY

## 2019-07-31 RX ORDER — SODIUM CHLORIDE 9 MG/ML
500 INJECTION, SOLUTION INTRAVENOUS CONTINUOUS
Status: DISCONTINUED | OUTPATIENT
Start: 2019-07-31 | End: 2019-07-31 | Stop reason: HOSPADM

## 2019-07-31 RX ORDER — DEXAMETHASONE SODIUM PHOSPHATE 10 MG/ML
INJECTION INTRAMUSCULAR; INTRAVENOUS
Status: DISCONTINUED | OUTPATIENT
Start: 2019-07-31 | End: 2019-07-31 | Stop reason: HOSPADM

## 2019-07-31 RX ORDER — LIDOCAINE HYDROCHLORIDE 10 MG/ML
INJECTION INFILTRATION; PERINEURAL
Status: DISCONTINUED | OUTPATIENT
Start: 2019-07-31 | End: 2019-07-31 | Stop reason: HOSPADM

## 2019-07-31 RX ORDER — FENTANYL CITRATE 50 UG/ML
INJECTION, SOLUTION INTRAMUSCULAR; INTRAVENOUS
Status: DISCONTINUED | OUTPATIENT
Start: 2019-07-31 | End: 2019-07-31 | Stop reason: HOSPADM

## 2019-07-31 RX ORDER — MIDAZOLAM HYDROCHLORIDE 1 MG/ML
INJECTION INTRAMUSCULAR; INTRAVENOUS
Status: DISCONTINUED | OUTPATIENT
Start: 2019-07-31 | End: 2019-07-31 | Stop reason: HOSPADM

## 2019-07-31 RX ORDER — LIDOCAINE HYDROCHLORIDE 5 MG/ML
INJECTION, SOLUTION INFILTRATION; INTRAVENOUS
Status: DISCONTINUED | OUTPATIENT
Start: 2019-07-31 | End: 2019-07-31 | Stop reason: HOSPADM

## 2019-07-31 RX ADMIN — SODIUM CHLORIDE 500 ML: 0.9 INJECTION, SOLUTION INTRAVENOUS at 07:07

## 2019-07-31 NOTE — H&P
HPI  Patient presenting for Procedure(s) (LRB):  INJECTION, STEROID, EPIDURAL, TRANSFORAMINAL APPROACH, ARMINDA L4-L5 Pt. cleared to hold Plavix 7 days and Pletal 3 days (Bilateral)     Patient on Anti-coagulation Yes.Stopped Plavix 7/24. Pletal was discontinued 1 week ago by his physician.    Today patient denies any fevers, chills, nausea, vomiting, changes in health, procedures such as colonoscopy or dental procedure in the last 2 weeks, or infections.    No health changes since previous encounter    Past Medical History:   Diagnosis Date    CHF (congestive heart failure)     Diabetes mellitus      Past Surgical History:   Procedure Laterality Date    INJECTION, STEROID, EPIDURAL, TRANSFORAMINAL APPROACH, L3-L4 need consent Plavix Bilateral 6/19/2019    Performed by Jason Shell MD at [facility]     Review of patient's allergies indicates:  No Known Allergies   Current Facility-Administered Medications   Medication    0.9%  NaCl infusion     Facility-Administered Medications Ordered in Other Encounters   Medication    0.9%  NaCl infusion       PMHx, PSHx, Allergies, Medications reviewed in epic    ROS negative except pain complaints in HPI    OBJECTIVE:    There were no vitals taken for this visit.    PHYSICAL EXAMINATION:    GENERAL: Well appearing, in no acute distress, alert and oriented x3.  PSYCH:  Mood and affect appropriate.  SKIN: Skin color, texture, turgor normal, no rashes or lesions which will impact the procedure.  CV: RRR with palpation of the radial artery.  PULM: No evidence of respiratory difficulty, symmetric chest rise. Clear to auscultation.  NEURO: Cranial nerves grossly intact.    Plan:    Proceed with procedure as planned Procedure(s) (LRB):  INJECTION, STEROID, EPIDURAL, TRANSFORAMINAL APPROACH, ARMINDA L4-L5 Pt. cleared to hold Plavix 7 days and Pletal 3 days (Bilateral)    CAMILO MartinezYfn Jun  07/31/2019

## 2019-07-31 NOTE — DISCHARGE INSTRUCTIONS
Thank you for allowing us to care for you today. You may receive a survey about the care we provided. Your feedback is valuable and helps us provide excellent care throughout the community.     Home Care Instructions for Pain Management:    1. DIET:   You may resume your normal diet today.   2. BATHING:   You may shower with luke warm water. No tub baths or anything that will soak injection sites under water for the next 24 hours.  3. DRESSING:   You may remove your bandage today.   4. ACTIVITY LEVEL:   You may resume your normal activities 24 hrs after your procedure. Nothing strenuous today.  5. MEDICATIONS:   You may resume your normal medications today. To restart blood thinners, ask your doctor.  6. DRIVING    If you have received any sedatives by mouth today, you may not drive for 12 hours.    If you have received any sedation through your IV, you may not drive for 24 hrs.   7. SPECIAL INSTRUCTIONS:   No heat to the injection site for 24 hrs including, hot bath or shower, heating pad, moist heat, or hot tubs.    Use ice pack to injection site for any pain or discomfort.  Apply ice packs for 20 minute intervals as needed.    IF you have diabetes, be sure to monitor your blood sugar more closely. IF your injection contained steroids your blood sugar levels may become higher than normal.    If you are still having pain upon discharge:  Your pain may improve over the next 48 hours. The anesthetic (numbing medication) works immediately to 48 hours. IF your injection contained a steroid (anti-inflammatory medication), it takes approximately 3 days to start feeling relief and 7-10 days to see your greatest results from the medication. It is possible you may need subsequent injections. This would be discussed at your follow up appointment with pain management or your referring doctor.      PLEASE CALL YOUR DOCTOR IF:  1. Redness or swelling around the injection site.  2. Fever of 101 degrees or more  3. Drainage  (pus) from the injection site.  4. For any continuous bleeding (some dried blood over the incision is normal.)    FOR EMERGENCIES:   If any unusual problems or difficulties occur during clinic hours, call [phone] or [phone].

## 2019-08-12 ENCOUNTER — OFFICE VISIT (OUTPATIENT)
Dept: PAIN MEDICINE | Facility: CLINIC | Age: 79
End: 2019-08-12
Payer: MEDICARE

## 2019-08-12 VITALS
WEIGHT: 191.81 LBS | DIASTOLIC BLOOD PRESSURE: 71 MMHG | HEIGHT: 67 IN | SYSTOLIC BLOOD PRESSURE: 128 MMHG | HEART RATE: 72 BPM | RESPIRATION RATE: 18 BRPM | BODY MASS INDEX: 30.1 KG/M2 | TEMPERATURE: 98 F

## 2019-08-12 DIAGNOSIS — M47.26 OSTEOARTHRITIS OF SPINE WITH RADICULOPATHY, LUMBAR REGION: ICD-10-CM

## 2019-08-12 DIAGNOSIS — M48.061 SPINAL STENOSIS OF LUMBAR REGION WITHOUT NEUROGENIC CLAUDICATION: ICD-10-CM

## 2019-08-12 DIAGNOSIS — M51.36 DDD (DEGENERATIVE DISC DISEASE), LUMBAR: ICD-10-CM

## 2019-08-12 DIAGNOSIS — M47.816 FACET ARTHRITIS OF LUMBAR REGION: ICD-10-CM

## 2019-08-12 DIAGNOSIS — M54.16 LUMBAR RADICULOPATHY: Primary | ICD-10-CM

## 2019-08-12 PROCEDURE — 99213 PR OFFICE/OUTPT VISIT, EST, LEVL III, 20-29 MIN: ICD-10-PCS | Mod: S$GLB,,, | Performed by: NURSE PRACTITIONER

## 2019-08-12 PROCEDURE — 99999 PR PBB SHADOW E&M-EST. PATIENT-LVL III: ICD-10-PCS | Mod: PBBFAC,,, | Performed by: NURSE PRACTITIONER

## 2019-08-12 PROCEDURE — 99213 OFFICE O/P EST LOW 20 MIN: CPT | Mod: S$GLB,,, | Performed by: NURSE PRACTITIONER

## 2019-08-12 PROCEDURE — 1100F PTFALLS ASSESS-DOCD GE2>/YR: CPT | Mod: CPTII,S$GLB,, | Performed by: NURSE PRACTITIONER

## 2019-08-12 PROCEDURE — 99999 PR PBB SHADOW E&M-EST. PATIENT-LVL III: CPT | Mod: PBBFAC,,, | Performed by: NURSE PRACTITIONER

## 2019-08-12 PROCEDURE — 1100F PR PT FALLS ASSESS DOC 2+ FALLS/FALL W/INJURY/YR: ICD-10-PCS | Mod: CPTII,S$GLB,, | Performed by: NURSE PRACTITIONER

## 2019-08-12 PROCEDURE — 3288F FALL RISK ASSESSMENT DOCD: CPT | Mod: CPTII,S$GLB,, | Performed by: NURSE PRACTITIONER

## 2019-08-12 PROCEDURE — 3288F PR FALLS RISK ASSESSMENT DOCUMENTED: ICD-10-PCS | Mod: CPTII,S$GLB,, | Performed by: NURSE PRACTITIONER

## 2019-08-12 NOTE — PROGRESS NOTES
Chronic Pain - Established Visit    Referring Physician: No ref. provider found     Chief Complaint:   Chief Complaint   Patient presents with    Foot Pain     right         SUBJECTIVE:    Interval History 8/12/2019:  The patient returns to clinic today for follow up. He is s/p bilateral L4 TF TRIP on 7/31/2019. He reports 50% relief of his low back and left leg pain. He reports no relief of his right foot pain and numbness. He continues to report right foot pain and numbness that worsens with standing and walking. He is scheduled to see his cardiologist in the next few weeks for an ultrasound and KIMBERLY. He also reports worsening ankle edema bilaterally. He continues to take Gabapentin with some benefit. He denies any other health changes. His pain today is 5/10.    Interval History 7/3/2019:  The patient returns to clinic today for follow up. He is s/p bilateral L3 TF TRIP on 6/19/2019. He reports significant relief of his back pain. He continues to report pain that radiates into the front and side of his left leg to his knee. He denies any right radicular leg pain. He does report burning, tingling, and numbness to the top of his right foot. His pain is worse with prolonged activity and at night. He continues to take Gabapentin with benefit. He denies any other health changes. His pain today is 5/10.    HPI:  Will Kan presents to the clinic for the evaluation of lower back pain. The pain started 3 years ago following non-related imjury and symptoms have been worsening. More recently patient underwent angiogram in 02/2019 and the back pain got acutely worse after laying flat for the required amount of time. The pain is located in the lower back area and radiates to the bilateral hip, worse on the left. The pain is described as aching and numbing and is rated as 4/10. The pain is rated with a score of  4/10 on the BEST day and a score of 10/10 on the WORST day. Symptoms interfere with activities around the house  such as yard work as well as exercise. The pain is exacerbated by bending, walking and getting out of bed/chair. The pain is mitigated by medications and rest. He reports spending 4 hours per day reclining. The patient reports 8 hours of uninterrupted sleep per night.    Patient denies night fever/night sweats, urinary incontinence, bowel incontinence, significant weight loss, significant motor weakness and loss of sensations.    Brief Hx:  Will Kan is a 78 y.o. male w/ a significant PMHx of HTN, HLD, PVD, CAD s/p 3v CABG in 2007, and T2DM. Patient presents to clinic for lower back pain that radiates down to the hip and is worse on the left side. On the left side pain shoots down the leg and stops at the knee.     Physical Therapy/Home Exercise: yes      Pain Disability Index Review:  Last 3 PDI Scores 7/3/2019 5/23/2019   Pain Disability Index (PDI) 5 13       Pain Medications:  - Opioids: Ultram ER (Tramadol HCL)  - Adjuvant Medications: Neurontin (Gabapentin)  - Anti-Coagulants: Aspirin  - Others: see med list      report:  Reviewed and consistent with medication use as prescribed.    Pain Procedures:   6/19/2019- Bilateral L3-4 TF TRIP  7/31/2019- Bilateral L4 TF TRIP    Imaging:     EMG (03/15/19):  Acute on chronic denervation in bilateral L5 and/or S1 myotomes and chronic denervation in bilateral L4 and/or L5 through S1 and/or S2 myotomes consistent with radiculopathies at those levels.     Outside MRI available in media.    Past Medical History:   Diagnosis Date    CHF (congestive heart failure)     Diabetes mellitus      Past Surgical History:   Procedure Laterality Date    INJECTION, STEROID, EPIDURAL, TRANSFORAMINAL APPROACH, ARMINDA L4-L5 Pt. cleared to hold Plavix 7 days and Pletal 3 days Bilateral 7/31/2019    Performed by Jason Shell [facility] at [facility]    INJECTION, STEROID, EPIDURAL, TRANSFORAMINAL APPROACH, L3-L4 need consent Plavix Bilateral 6/19/2019    Performed by Jason BYRNES  MD [facility] at [facility]     Social History     Socioeconomic History    Marital status:      Spouse name: Not on file    Number of children: Not on file    Years of education: Not on file    Highest education level: Not on file   Occupational History    Not on file   Social Needs    Financial resource strain: Not on file    Food insecurity:     Worry: Not on file     Inability: Not on file    Transportation needs:     Medical: Not on file     Non-medical: Not on file   Tobacco Use    Smoking status: Former Smoker    Smokeless tobacco: Never Used    Tobacco comment: 20 years ago   Substance and Sexual Activity    Alcohol use: Not on file    Drug use: Not on file    Sexual activity: Not on file   Lifestyle    Physical activity:     Days per week: Not on file     Minutes per session: Not on file    Stress: Not on file   Relationships    Social connections:     Talks on phone: Not on file     Gets together: Not on file     Attends Anglican service: Not on file     Active member of club or organization: Not on file     Attends meetings of clubs or organizations: Not on file     Relationship status: Not on file   Other Topics Concern    Not on file   Social History Narrative    Not on file     No family history on file.    Review of patient's allergies indicates:  No Known Allergies    Current Outpatient Medications   Medication Sig    aspirin (ASPIR-81) 81 MG EC tablet Take 81 mg by mouth.    cilostazol (PLETAL) 100 MG Tab     clopidogrel (PLAVIX) 75 mg tablet clopidogrel 75 mg tablet    eplerenone (INSPRA) 25 MG Tab     eplerenone (INSPRA) 25 MG Tab eplerenone 25 mg tablet    fluticasone propionate (FLONASE) 50 mcg/actuation nasal spray fluticasone propionate 50 mcg/actuation nasal spray,suspension    furosemide (LASIX) 40 MG tablet Take 40 mg by mouth 2 (two) times daily.    gabapentin (NEURONTIN) 300 MG capsule gabapentin 300 mg capsule   TAKE 1 CAPSULE BY MOUTH THREE TIMES A  "DAY AS NEEDED    glimepiride (AMARYL) 2 MG tablet glimepiride 2 mg tablet    metoprolol tartrate (LOPRESSOR) 50 MG tablet metoprolol tartrate 50 mg tablet    pravastatin (PRAVACHOL) 80 MG tablet pravastatin 80 mg tablet    traMADol (ULTRAM) 50 mg tablet tramadol 50 mg tablet     No current facility-administered medications for this visit.      Facility-Administered Medications Ordered in Other Visits   Medication    0.9%  NaCl infusion       REVIEW OF SYSTEMS:  GENERAL: No weight loss, malaise or fevers.  HEENT: Negative for frequent or significant headaches.  NECK: Negative for lumps, goiter, pain and significant neck swelling.  RESPIRATORY: Negative for cough, wheezing or shortness of breath.  CARDIOVASCULAR: Negative for chest pain, leg swelling or palpitations. CAD, hx of CABG  GI: Negative for abdominal discomfort, blood in stools or black stools or change in bowel habits.  MUSCULOSKELETAL: See HPI.  SKIN: Negative for lesions, rash, and itching.  PSYCH: +ve for sleep disturbance, mood disorder and recent psychosocial stressors.  HEMATOLOGY/LYMPHOLOGY: Negative for swollen nodes. Plavix  NEURO: No history of headaches, syncope, paralysis, seizures or tremors.  All other reviewed and negative other than HPI.    OBJECTIVE:    /71   Pulse 72   Temp 97.6 °F (36.4 °C) (Oral)   Resp 18   Ht 5' 7" (1.702 m)   Wt 87 kg (191 lb 12.8 oz)   BMI 30.04 kg/m²     PHYSICAL EXAMINATION:  General appearance: Well appearing, in no acute distress, alert and oriented x3.  Psych:  Mood and affect appropriate.  Skin: Skin color, texture, turgor normal, no rashes or lesions, in both upper and lower body.  Head/face:  Normocephalic, atraumatic. No palpable lymph nodes.  Cor: RRR  Pulm: CTA  GI: Soft and non-tender.  Back: Straight leg raising in the sitting positions is negative to radicular pain bilaterally. Mild pain with palpation over lumbar spine. Limited ROM with pain on extension. Positive facet loading to the " left.   Extremities: Peripheral joint ROM is full and pain free without obvious instability or laxity in all four extremities. No deformities, edema, or skin discoloration. Good capillary refill.  Musculoskeletal: Shoulder, hip, sacroiliac and knee provocative maneuvers are negative. Bilateral lower extremity strength is normal and symmetric. No atrophy or tone abnormalities are noted.  Neuro: Bilateral lower extremity coordination and muscle stretch reflexes are physiologic and symmetric. Plantar response are downgoing. No loss of sensation is noted.  Gait: Antalgic- ambulates without assistance.     ASSESSMENT: 78 y.o. year old male with pain, consistent with     1. Lumbar radiculopathy     2. Spinal stenosis of lumbar region without neurogenic claudication     3. Osteoarthritis of spine with radiculopathy, lumbar region     4. Facet arthritis of lumbar region     5. DDD (degenerative disc disease), lumbar         PLAN:     - Previous imaging was reviewed and discussed with the patient today.    - He would like to wait before pursuing any other procedures at this time.     - Consider lumbar medial branch blocks in the future.     - Continue Gabapentin.     - I have stressed the importance of physical activity and a home exercise plan to help with pain and improve health.    - RTC in 3 months or sooner if needed.      - Counseled patient regarding the importance of activity modification, constant sleeping habits and physical therapy.    - Dr. Shell was consulted on the patient and agrees with this plan.    Virginia Morejon NP  08/12/2019

## 2019-08-18 ENCOUNTER — PATIENT MESSAGE (OUTPATIENT)
Dept: PAIN MEDICINE | Facility: CLINIC | Age: 79
End: 2019-08-18

## 2019-09-23 ENCOUNTER — PATIENT MESSAGE (OUTPATIENT)
Dept: PAIN MEDICINE | Facility: CLINIC | Age: 79
End: 2019-09-23

## 2019-09-23 RX ORDER — METOPROLOL TARTRATE 25 MG/1
25 TABLET, FILM COATED ORAL DAILY
COMMUNITY

## 2019-09-23 RX ORDER — PRAVASTATIN SODIUM 40 MG/1
40 TABLET ORAL DAILY
COMMUNITY

## 2019-09-24 ENCOUNTER — PATIENT MESSAGE (OUTPATIENT)
Dept: PAIN MEDICINE | Facility: CLINIC | Age: 79
End: 2019-09-24

## 2019-10-05 ENCOUNTER — PATIENT MESSAGE (OUTPATIENT)
Dept: PAIN MEDICINE | Facility: CLINIC | Age: 79
End: 2019-10-05

## 2019-11-11 ENCOUNTER — TELEPHONE (OUTPATIENT)
Dept: PAIN MEDICINE | Facility: CLINIC | Age: 79
End: 2019-11-11

## 2019-11-11 NOTE — TELEPHONE ENCOUNTER
Called to confirm appointment with Dr. Shell on 11/12/19 @ 9:30am in pain management clinic.      Pt confirmed appt.

## 2019-11-12 ENCOUNTER — OFFICE VISIT (OUTPATIENT)
Dept: PAIN MEDICINE | Facility: CLINIC | Age: 79
End: 2019-11-12
Attending: ANESTHESIOLOGY
Payer: MEDICARE

## 2019-11-12 VITALS
BODY MASS INDEX: 30.86 KG/M2 | OXYGEN SATURATION: 100 % | WEIGHT: 196.63 LBS | TEMPERATURE: 98 F | RESPIRATION RATE: 18 BRPM | HEART RATE: 81 BPM | DIASTOLIC BLOOD PRESSURE: 77 MMHG | HEIGHT: 67 IN | SYSTOLIC BLOOD PRESSURE: 130 MMHG

## 2019-11-12 DIAGNOSIS — M47.819 SPONDYLOSIS WITHOUT MYELOPATHY: ICD-10-CM

## 2019-11-12 DIAGNOSIS — M47.9 OSTEOARTHRITIS OF SPINE, UNSPECIFIED SPINAL OSTEOARTHRITIS COMPLICATION STATUS, UNSPECIFIED SPINAL REGION: ICD-10-CM

## 2019-11-12 DIAGNOSIS — M51.36 DDD (DEGENERATIVE DISC DISEASE), LUMBAR: ICD-10-CM

## 2019-11-12 DIAGNOSIS — M54.15 RADICULOPATHY OF THORACOLUMBAR REGION: Primary | ICD-10-CM

## 2019-11-12 DIAGNOSIS — I73.9 PERIPHERAL VASCULAR DISEASE: ICD-10-CM

## 2019-11-12 DIAGNOSIS — M48.00 SPINAL STENOSIS, UNSPECIFIED SPINAL REGION: ICD-10-CM

## 2019-11-12 PROCEDURE — 99213 OFFICE O/P EST LOW 20 MIN: CPT | Mod: GC,S$GLB,, | Performed by: ANESTHESIOLOGY

## 2019-11-12 PROCEDURE — 99999 PR PBB SHADOW E&M-EST. PATIENT-LVL IV: CPT | Mod: PBBFAC,,, | Performed by: ANESTHESIOLOGY

## 2019-11-12 PROCEDURE — 1159F PR MEDICATION LIST DOCUMENTED IN MEDICAL RECORD: ICD-10-PCS | Mod: S$GLB,,, | Performed by: ANESTHESIOLOGY

## 2019-11-12 PROCEDURE — 1125F AMNT PAIN NOTED PAIN PRSNT: CPT | Mod: S$GLB,,, | Performed by: ANESTHESIOLOGY

## 2019-11-12 PROCEDURE — 1101F PT FALLS ASSESS-DOCD LE1/YR: CPT | Mod: CPTII,S$GLB,, | Performed by: ANESTHESIOLOGY

## 2019-11-12 PROCEDURE — 99999 PR PBB SHADOW E&M-EST. PATIENT-LVL IV: ICD-10-PCS | Mod: PBBFAC,,, | Performed by: ANESTHESIOLOGY

## 2019-11-12 PROCEDURE — 99213 PR OFFICE/OUTPT VISIT, EST, LEVL III, 20-29 MIN: ICD-10-PCS | Mod: GC,S$GLB,, | Performed by: ANESTHESIOLOGY

## 2019-11-12 PROCEDURE — 1159F MED LIST DOCD IN RCRD: CPT | Mod: S$GLB,,, | Performed by: ANESTHESIOLOGY

## 2019-11-12 PROCEDURE — 1101F PR PT FALLS ASSESS DOC 0-1 FALLS W/OUT INJ PAST YR: ICD-10-PCS | Mod: CPTII,S$GLB,, | Performed by: ANESTHESIOLOGY

## 2019-11-12 PROCEDURE — 1125F PR PAIN SEVERITY QUANTIFIED, PAIN PRESENT: ICD-10-PCS | Mod: S$GLB,,, | Performed by: ANESTHESIOLOGY

## 2019-11-12 NOTE — PROGRESS NOTES
Chronic patient Established Note (Follow up visit)      SUBJECTIVE:    Interval History 11/12/19  Will Kan presents to the clinic for a follow-up appointment for lower pain. Current pain intensity is 4/10. He states worst pain is 5/10.    Interval History 8/12/2019:  The patient returns to clinic today for follow up. He is s/p bilateral L4 TF TRIP on 7/31/2019. He reports 50% relief of his low back and left leg pain. He reports no relief of his right foot pain and numbness. He continues to report right foot pain and numbness that worsens with standing and walking. He is scheduled to see his cardiologist in the next few weeks for an ultrasound and KIMBERLY. He also reports worsening ankle edema bilaterally. He continues to take Gabapentin with some benefit. He denies any other health changes. His pain today is 5/10.     Interval History 7/3/2019:  The patient returns to clinic today for follow up. He is s/p bilateral L3 TF TRIP on 6/19/2019. He reports significant relief of his back pain. He continues to report pain that radiates into the front and side of his left leg to his knee. He denies any right radicular leg pain. He does report burning, tingling, and numbness to the top of his right foot. His pain is worse with prolonged activity and at night. He continues to take Gabapentin with benefit. He denies any other health changes. His pain today is 5/10.     HPI:  Will Kan presents to the clinic for the evaluation of lower back pain. The pain started 3 years ago following non-related imjury and symptoms have been worsening. More recently patient underwent angiogram in 02/2019 and the back pain got acutely worse after laying flat for the required amount of time. The pain is located in the lower back area and radiates to the bilateral hip, worse on the left. The pain is described as aching and numbing and is rated as 4/10. The pain is rated with a score of  4/10 on the BEST day and a score of 10/10 on the  WORST day. Symptoms interfere with activities around the house such as yard work as well as exercise. The pain is exacerbated by bending, walking and getting out of bed/chair. The pain is mitigated by medications and rest. He reports spending 4 hours per day reclining. The patient reports 8 hours of uninterrupted sleep per night.     Patient denies night fever/night sweats, urinary incontinence, bowel incontinence, significant weight loss, significant motor weakness and loss of sensations.     Brief Hx:  Will Kan is a 78 y.o. male w/ a significant PMHx of HTN, HLD, PVD, CAD s/p 3v CABG in 2007, and T2DM. Patient presents to clinic for lower back pain that radiates down to the hip and is worse on the left side. On the left side pain shoots down the leg and stops at the knee.        Pain Disability Index Review:  Last 3 PDI Scores 7/3/2019 5/23/2019   Pain Disability Index (PDI) 5 13       Pain Medications:    - Anti-convulsants: Gabapentin 600mg qhs  - Anti-coagulation: Aspirin and Plavix    Opioid Contract: no       report:  Reviewed and consistent with medication use as prescribed.     Pain Procedures:   6/19/2019- Bilateral L3-4 TF TRIP  7/31/2019- Bilateral L4 TF TRIP    Physical Therapy/Home Exercise: no    EMG (03/15/19):  Acute on chronic denervation in bilateral L5 and/or S1 myotomes and chronic denervation in bilateral L4 and/or L5 through S1 and/or S2 myotomes consistent with radiculopathies at those levels.        Imaging:   Narrative     EXAMINATION:  XR LUMBAR SPINE 5 VIEW WITH FLEX AND EXT; XR HIPS BILATERAL 2 VIEW INCL AP PELVIS    CLINICAL HISTORY:  Low back pain, >6wks conservative tx, persistent-progressive sx, surgical candidate;  Atherosclerotic heart disease of native coronary artery without angina pectoris    TECHNIQUE:  Five views of the lumbar spine plus flexion extension views and two views of both hips were performed.    COMPARISON:  None.    FINDINGS:  Mild anterior height loss of  T12.    There is multilevel degenerative disc disease, noting disc height loss and endplate changes.  L4-5 and L5-S1 facet arthropathy noted.  SI joints unremarkable.  No subluxation with extension and flexion views.    There is mild joint space narrowing with subchondral sclerosis of the hip joints, suggesting osteoarthritis.    Prominent scattered calcified atherosclerotic disease.    No marrow replacement process.      Impression       Mild anterior compression fracture T12, age indeterminate.    Multilevel lumbar spondylosis.    Mild hip degenerative change.      Electronically signed by: Yasmani Fowler MD  Date: 05/24/2019  Time: 12:50         Allergies: Review of patient's allergies indicates:  No Known Allergies    Current Medications:   Current Outpatient Medications   Medication Sig Dispense Refill    aspirin (ASPIR-81) 81 MG EC tablet Take 81 mg by mouth.      clopidogrel (PLAVIX) 75 mg tablet clopidogrel 75 mg tablet      eplerenone (INSPRA) 25 MG Tab       fluticasone propionate (FLONASE) 50 mcg/actuation nasal spray fluticasone propionate 50 mcg/actuation nasal spray,suspension      furosemide (LASIX) 40 MG tablet Take 40 mg by mouth 2 (two) times daily.      gabapentin (NEURONTIN) 300 MG capsule gabapentin 300 mg capsule   TAKE 1 CAPSULE BY MOUTH THREE TIMES A DAY AS NEEDED      glimepiride (AMARYL) 2 MG tablet glimepiride 2 mg tablet      metoprolol tartrate (LOPRESSOR) 25 MG tablet Take 25 mg by mouth once daily.      pravastatin (PRAVACHOL) 40 MG tablet Take 40 mg by mouth once daily.      traMADol (ULTRAM) 50 mg tablet tramadol 50 mg tablet       No current facility-administered medications for this visit.      Facility-Administered Medications Ordered in Other Visits   Medication Dose Route Frequency Provider Last Rate Last Dose    0.9%  NaCl infusion  500 mL Intravenous Continuous Yamilex Schmidt MD           REVIEW OF SYSTEMS:    GENERAL:  No weight loss, malaise or fevers.  HEENT:   Negative for frequent or significant headaches.  NECK:  Negative for lumps, goiter, pain and significant neck swelling.  RESPIRATORY:  Negative for cough, wheezing or shortness of breath.  CARDIOVASCULAR:  Negative for chest pain, leg swelling or palpitations.  GI:  Negative for abdominal discomfort, blood in stools or black stools or change in bowel habits.  MUSCULOSKELETAL:  See HPI.  SKIN:  Negative for lesions, rash, and itching.  PSYCH:  +ve for sleep disturbance, mood disorder and recent psychosocial stressors.  HEMATOLOGY/LYMPHOLOGY:  Negative for prolonged bleeding, bruising easily or swollen nodes.  NEURO:   No history of headaches, syncope, paralysis, seizures or tremors.  All other reviewed and negative other than HPI.    Past Medical History:  Past Medical History:   Diagnosis Date    CHF (congestive heart failure)     Diabetes mellitus        Past Surgical History:  Past Surgical History:   Procedure Laterality Date    TRANSFORAMINAL EPIDURAL INJECTION OF STEROID Bilateral 6/19/2019    Procedure: INJECTION, STEROID, EPIDURAL, TRANSFORAMINAL APPROACH, L3-L4 need consent Plavix;  Surgeon: Jason Shell MD;  [place] [facility] [place];  Service: Pain Management;  Laterality: Bilateral;  PLAVIX CLEARANCE SCANNED TO MEDIA    TRANSFORAMINAL EPIDURAL INJECTION OF STEROID Bilateral 7/31/2019    Procedure: INJECTION, STEROID, EPIDURAL, TRANSFORAMINAL APPROACH, ARMINDA L4-L5 Pt. cleared to hold Plavix 7 days and Pletal 3 days;  Surgeon: Jason hSell MD;  Location: [facility];  Service: Pain Management;  Laterality: Bilateral;       Family History:  No family history on file.    Social History:  Social History     Socioeconomic History    Marital status:      Spouse name: Not on file    Number of children: Not on file    Years of education: Not on file    Highest education level: Not on file   Occupational History    Not on file   Social Needs    Financial resource strain: Not on file     Food insecurity:     Worry: Not on file     Inability: Not on file    Transportation needs:     Medical: Not on file     Non-medical: Not on file   Tobacco Use    Smoking status: Former Smoker    Smokeless tobacco: Never Used    Tobacco comment: 20 years ago   Substance and Sexual Activity    Alcohol use: Not on file    Drug use: Not on file    Sexual activity: Not on file   Lifestyle    Physical activity:     Days per week: Not on file     Minutes per session: Not on file    Stress: Not on file   Relationships    Social connections:     Talks on phone: Not on file     Gets together: Not on file     Attends Christian service: Not on file     Active member of club or organization: Not on file     Attends meetings of clubs or organizations: Not on file     Relationship status: Not on file   Other Topics Concern    Not on file   Social History Narrative    Not on file       OBJECTIVE:    There were no vitals taken for this visit.    PHYSICAL EXAMINATION:    General appearance: Well appearing, in no acute distress, alert and oriented x3.  Psych:  Mood and affect appropriate.  Skin: Skin color, texture, turgor normal, no rashes or lesions, in both upper and lower body.  Head/face:  Normocephalic, atraumatic. No palpable lymph nodes.  Cor: RRR  Pulm: CTA  GI: Soft and non-tender.  Back: Straight leg raising in the sitting positions is negative to radicular pain bilaterally. Mild pain with palpation over lumbar spine. Limited ROM with pain on extension. Positive facet loading to the left.   Extremities: Peripheral joint ROM is full and pain free without obvious instability or laxity in all four extremities. No deformities, edema, or skin discoloration. Good capillary refill.  Musculoskeletal: Shoulder, hip, sacroiliac and knee provocative maneuvers are negative. Bilateral lower extremity strength is normal and symmetric. No atrophy or tone abnormalities are noted.  Neuro: Bilateral lower extremity coordination  and muscle stretch reflexes are physiologic and symmetric. Plantar response are downgoing. No loss of sensation is noted.  Gait: Antalgic- ambulates without assistance.     ASSESSMENT: 79 y.o. year old male with lower back and lower extremity pain, consistent with      1. Radiculopathy of thoracolumbar region     2. DDD (degenerative disc disease), lumbar     3. Spondylosis without myelopathy     4. Osteoarthritis of spine, unspecified spinal osteoarthritis complication status, unspecified spinal region     5. Spinal stenosis, unspecified spinal region     6. Peripheral vascular disease           PLAN:     - I have stressed the importance of physical activity and a home exercise plan to help with pain and improve health.  - Referral to Physical therapy for Lumbar stabilization, core strengthening, and a home exercise program.  - Continue Gabapentin  - Re-assess and consider interventional treatment after lower extremity re-vascularization with vascular surgery  - RTC 3 months  - Counseled patient regarding the importance of constant sleeping habits and physical therapy.    The above plan and management options were discussed at length with patient. Patient is in agreement with the above and verbalized understanding.    Vitaly Lopez MD  PGY3/CA2  Department of Anesthesiology  Pager: [phone]   I have personally reviewed the history and exam of this patient and agree with the resident/fellow/NPs note as stated above.    Jason Shell MD    11/12/19

## 2019-11-20 ENCOUNTER — PATIENT MESSAGE (OUTPATIENT)
Dept: PAIN MEDICINE | Facility: CLINIC | Age: 79
End: 2019-11-20

## 2019-11-25 ENCOUNTER — PATIENT MESSAGE (OUTPATIENT)
Dept: PAIN MEDICINE | Facility: CLINIC | Age: 79
End: 2019-11-25

## 2019-11-25 PROBLEM — M48.00 SPINAL STENOSIS: Status: ACTIVE | Noted: 2019-11-25

## 2019-11-25 PROBLEM — M47.9 OSTEOARTHRITIS OF SPINE: Status: ACTIVE | Noted: 2019-11-25

## 2019-11-25 PROBLEM — M54.15 RADICULOPATHY OF THORACOLUMBAR REGION: Status: ACTIVE | Noted: 2019-11-25

## 2019-11-25 PROBLEM — M51.36 LUMBAR DEGENERATIVE DISC DISEASE: Status: RESOLVED | Noted: 2019-05-23 | Resolved: 2019-11-25

## 2019-12-14 ENCOUNTER — PATIENT MESSAGE (OUTPATIENT)
Dept: PAIN MEDICINE | Facility: CLINIC | Age: 79
End: 2019-12-14

## 2019-12-16 ENCOUNTER — PATIENT MESSAGE (OUTPATIENT)
Dept: PAIN MEDICINE | Facility: CLINIC | Age: 79
End: 2019-12-16

## 2020-07-07 ENCOUNTER — OFFICE VISIT (OUTPATIENT)
Dept: PAIN MEDICINE | Facility: CLINIC | Age: 80
End: 2020-07-07
Attending: ANESTHESIOLOGY
Payer: MEDICARE

## 2020-07-07 DIAGNOSIS — M16.9 OSTEOARTHRITIS OF HIP, UNSPECIFIED LATERALITY, UNSPECIFIED OSTEOARTHRITIS TYPE: Primary | ICD-10-CM

## 2020-07-07 DIAGNOSIS — M70.60 GREATER TROCHANTERIC BURSITIS, UNSPECIFIED LATERALITY: ICD-10-CM

## 2020-07-07 PROCEDURE — 1159F MED LIST DOCD IN RCRD: CPT | Mod: 95,,, | Performed by: ANESTHESIOLOGY

## 2020-07-07 PROCEDURE — 1101F PT FALLS ASSESS-DOCD LE1/YR: CPT | Mod: CPTII,95,, | Performed by: ANESTHESIOLOGY

## 2020-07-07 PROCEDURE — 99213 OFFICE O/P EST LOW 20 MIN: CPT | Mod: 95,,, | Performed by: ANESTHESIOLOGY

## 2020-07-07 PROCEDURE — 1159F PR MEDICATION LIST DOCUMENTED IN MEDICAL RECORD: ICD-10-PCS | Mod: 95,,, | Performed by: ANESTHESIOLOGY

## 2020-07-07 PROCEDURE — 99213 PR OFFICE/OUTPT VISIT, EST, LEVL III, 20-29 MIN: ICD-10-PCS | Mod: 95,,, | Performed by: ANESTHESIOLOGY

## 2020-07-07 PROCEDURE — 1101F PR PT FALLS ASSESS DOC 0-1 FALLS W/OUT INJ PAST YR: ICD-10-PCS | Mod: CPTII,95,, | Performed by: ANESTHESIOLOGY

## 2020-07-16 NOTE — PROGRESS NOTES
Chronic Pain-Tele-Medicine-Established Note (Follow up visit)      The patient location is: home  The chief complaint leading to consultation is: hip  pain  Visit type: Virtual visit with synchronous audio and video  Total time spent with patient: 15 min  Each patient to whom he or she provides medical services by telemedicine is:  (1) informed of the relationship between the physician and patient and the respective role of any other health care provider with respect to management of the patient; and (2) notified that he or she may decline to receive medical services by telemedicine and may withdraw from such care at any time.        Notes:     SUBJECTIVE:    Interval History 7/7/2020:  Will Kan presents tele-medicine appointment for a follow-up appointment for new hip pain. Since the last visit, Will Kan states the pain has been worsening. Current pain intensity is 7/10.   Patient states his back pain is much better, he wanted to discuss with us this new  hip pain as he been evaluated in injected by orthopedics with no improvement    Interval History 8/12/2019:  The patient returns to clinic today for follow up. He is s/p bilateral L4 TF TRIP on 7/31/2019. He reports 50% relief of his low back and left leg pain. He reports no relief of his right foot pain and numbness. He continues to report right foot pain and numbness that worsens with standing and walking. He is scheduled to see his cardiologist in the next few weeks for an ultrasound and KIMBERLY. He also reports worsening ankle edema bilaterally. He continues to take Gabapentin with some benefit. He denies any other health changes. His pain today is 5/10.     Interval History 7/3/2019:  The patient returns to clinic today for follow up. He is s/p bilateral L3 TF TRIP on 6/19/2019. He reports significant relief of his back pain. He continues to report pain that radiates into the front and side of his left leg to his knee. He denies any right  radicular leg pain. He does report burning, tingling, and numbness to the top of his right foot. His pain is worse with prolonged activity and at night. He continues to take Gabapentin with benefit. He denies any other health changes. His pain today is 5/10.     HPI:  Will Kan presents to the clinic for the evaluation of lower back pain. The pain started 3 years ago following non-related imjury and symptoms have been worsening. More recently patient underwent angiogram in 02/2019 and the back pain got acutely worse after laying flat for the required amount of time. The pain is located in the lower back area and radiates to the bilateral hip, worse on the left. The pain is described as aching and numbing and is rated as 4/10. The pain is rated with a score of  4/10 on the BEST day and a score of 10/10 on the WORST day. Symptoms interfere with activities around the house such as yard work as well as exercise. The pain is exacerbated by bending, walking and getting out of bed/chair. The pain is mitigated by medications and rest. He reports spending 4 hours per day reclining. The patient reports 8 hours of uninterrupted sleep per night.     Patient denies night fever/night sweats, urinary incontinence, bowel incontinence, significant weight loss, significant motor weakness and loss of sensations.     Brief Hx:  Will Kan is a 78 y.o. male w/ a significant PMHx of HTN, HLD, PVD, CAD s/p 3v CABG in 2007, and T2DM. Patient presents to clinic for lower back pain that radiates down to the hip and is worse on the left side. On the left side pain shoots down the leg and stops at the knee.   Pain Medications:    - Opioids:  Tramadol  - NSAIDs:  None  - Anti-Depressants:  None  - Anti-Convulsants:  Gabapentin      Physical Therapy/Home Exercise: no       report:  Reviewed and consistent with medication use as prescribed.      Pain Procedures:   6/19/2019- Bilateral L3-4 TF TRIP  7/31/2019- Bilateral L4 TF  TRIP         Imaging:   Hip MRI 9/19       EMG (03/15/19):  Acute on chronic denervation in bilateral L5 and/or S1 myotomes and chronic denervation in bilateral L4 and/or L5 through S1 and/or S2 myotomes consistent with radiculopathies at those levels.    6    Past Medical History:   Diagnosis Date    CHF (congestive heart failure)     Diabetes mellitus      Past Surgical History:   Procedure Laterality Date    TRANSFORAMINAL EPIDURAL INJECTION OF STEROID Bilateral 6/19/2019    Procedure: INJECTION, STEROID, EPIDURAL, TRANSFORAMINAL APPROACH, L3-L4 need consent Plavix;  Surgeon: Jason Shell MD;  Location: [facility];  Service: Pain Management;  Laterality: Bilateral;  PLAVIX CLEARANCE SCANNED TO MEDIA    TRANSFORAMINAL EPIDURAL INJECTION OF STEROID Bilateral 7/31/2019    Procedure: INJECTION, STEROID, EPIDURAL, TRANSFORAMINAL APPROACH, ARMINDA L4-L5 Pt. cleared to hold Plavix 7 days and Pletal 3 days;  Surgeon: Jason Shell MD;  Location: [facility];  Service: Pain Management;  Laterality: Bilateral;     Social History     Socioeconomic History    Marital status:      Spouse name: Not on file    Number of children: Not on file    Years of education: Not on file    Highest education level: Not on file   Occupational History    Not on file   Social Needs    Financial resource strain: Not on file    Food insecurity     Worry: Not on file     Inability: Not on file    Transportation needs     Medical: Not on file     Non-medical: Not on file   Tobacco Use    Smoking status: Former Smoker    Smokeless tobacco: Never Used    Tobacco comment: 20 years ago   Substance and Sexual Activity    Alcohol use: Not on file    Drug use: Not on file    Sexual activity: Not on file   Lifestyle    Physical activity     Days per week: Not on file     Minutes per session: Not on file    Stress: Not on file   Relationships    Social connections     Talks on phone: Not on file     Gets together:  Not on file     Attends Latter day service: Not on file     Active member of club or organization: Not on file     Attends meetings of clubs or organizations: Not on file     Relationship status: Not on file   Other Topics Concern    Not on file   Social History Narrative    Not on file     History reviewed. No pertinent family history.    Review of patient's allergies indicates:  No Known Allergies    Current Outpatient Medications   Medication Sig    aspirin (ASPIR-81) 81 MG EC tablet Take 81 mg by mouth.    clopidogrel (PLAVIX) 75 mg tablet clopidogrel 75 mg tablet    eplerenone (INSPRA) 25 MG Tab     fluticasone propionate (FLONASE) 50 mcg/actuation nasal spray fluticasone propionate 50 mcg/actuation nasal spray,suspension    furosemide (LASIX) 40 MG tablet Take 40 mg by mouth 2 (two) times daily.    gabapentin (NEURONTIN) 300 MG capsule gabapentin 300 mg capsule   TAKE 1 CAPSULE BY MOUTH THREE TIMES A DAY AS NEEDED    glimepiride (AMARYL) 2 MG tablet glimepiride 2 mg tablet    metoprolol tartrate (LOPRESSOR) 25 MG tablet Take 25 mg by mouth once daily.    pravastatin (PRAVACHOL) 40 MG tablet Take 40 mg by mouth once daily.    traMADol (ULTRAM) 50 mg tablet tramadol 50 mg tablet     No current facility-administered medications for this visit.      Facility-Administered Medications Ordered in Other Visits   Medication    0.9%  NaCl infusion       REVIEW OF SYSTEMS:    GENERAL:  No weight loss, malaise or fevers.  HEENT:   No recent changes in vision or hearing  NECK:  Negative for lumps, no difficulty with swallowing.  RESPIRATORY:  Negative for cough, wheezing or shortness of breath, patient denies any recent URI.  CARDIOVASCULAR:  Negative for chest pain, leg swelling or palpitations.  GI:  Negative for abdominal discomfort, blood in stools or black stools or change in bowel habits.  MUSCULOSKELETAL:  See HPI.  SKIN:  Negative for lesions, rash, and itching.  PSYCH:  No mood disorder or recent  psychosocial stressors.  Patients sleep is disturbed secondary to pain.  HEMATOLOGY/LYMPHOLOGY:  Negative for prolonged bleeding, bruising easily or swollen nodes.  Patient is not currently taking any anti-coagulants  NEURO:   No history of headaches, syncope, paralysis, seizures or tremors.  All other reviewed and negative other than HPI.    OBJECTIVE:    General appearance: Well appearing, in no acute distress, alert and oriented x3.  Psych:  Mood and affect appropriate.      ASSESSMENT: 79 y.o. year old male with hip pain, consistent with     1. Osteoarthritis of hip, unspecified laterality, unspecified osteoarthritis type     2. Greater trochanteric bursitis, unspecified laterality           PLAN:     - I have stressed the importance of physical activity and a home exercise plan to help with pain and improve health.  - Patient can continue with medications for now since they are providing benefits, using them appropriately, and without side effects.  - discussed with the patient the findings in the MRI report and my recommendation for a fluoroscopic guided hip and bursa injection, patient stated that he will think about it but he is limiting his visits to the hospital due to COVID-19 crisis and will let us know once he make a decision  -patient stated that his happy with his current medical management regimen he does not want add or increase any medication  - RTC as needed for follow-up  - Counseled patient regarding the importance of activity modification, constant sleeping habits and physical therapy.    The above plan and management options were discussed at length with patient. Patient is in agreement with the above and verbalized understanding. It will be communicated with the referring physician via electronic record, fax, or mail.    Jason Shell  07/7/2020

## 2020-07-17 ENCOUNTER — TELEPHONE (OUTPATIENT)
Dept: PAIN MEDICINE | Facility: CLINIC | Age: 80
End: 2020-07-17

## 2020-09-23 ENCOUNTER — PATIENT MESSAGE (OUTPATIENT)
Dept: RESEARCH | Facility: OTHER | Age: 80
End: 2020-09-23

## 2021-01-04 ENCOUNTER — PATIENT MESSAGE (OUTPATIENT)
Dept: ADMINISTRATIVE | Facility: OTHER | Age: 81
End: 2021-01-04

## 2021-01-05 ENCOUNTER — PATIENT MESSAGE (OUTPATIENT)
Dept: PAIN MEDICINE | Facility: CLINIC | Age: 81
End: 2021-01-05

## 2021-01-09 ENCOUNTER — IMMUNIZATION (OUTPATIENT)
Dept: INTERNAL MEDICINE | Facility: CLINIC | Age: 81
End: 2021-01-09
Payer: MEDICARE

## 2021-01-09 DIAGNOSIS — Z23 NEED FOR VACCINATION: ICD-10-CM

## 2021-01-09 PROCEDURE — 91300 COVID-19, MRNA, LNP-S, PF, 30 MCG/0.3 ML DOSE VACCINE: CPT | Mod: PBBFAC | Performed by: INTERNAL MEDICINE

## 2021-01-30 ENCOUNTER — IMMUNIZATION (OUTPATIENT)
Dept: INTERNAL MEDICINE | Facility: CLINIC | Age: 81
End: 2021-01-30
Payer: MEDICARE

## 2021-01-30 DIAGNOSIS — Z23 NEED FOR VACCINATION: Primary | ICD-10-CM

## 2021-01-30 PROCEDURE — 0002A PR IMMUNIZ ADMIN, SARS-COV-2 COVID-19 VACC, 30MCG/0.3ML, 2ND DOSE: CPT | Mod: PBBFAC | Performed by: INTERNAL MEDICINE

## 2021-01-30 PROCEDURE — 91300 PR SARS-COV- 2 COVID-19 VACCINE, NO PRSV, 30MCG/0.3ML, IM: CPT | Mod: PBBFAC | Performed by: INTERNAL MEDICINE

## 2021-01-30 RX ADMIN — RNA INGREDIENT BNT-162B2 0.3 ML: 0.23 INJECTION, SUSPENSION INTRAMUSCULAR at 11:01

## 2021-11-20 ENCOUNTER — PATIENT MESSAGE (OUTPATIENT)
Dept: PAIN MEDICINE | Facility: CLINIC | Age: 81
End: 2021-11-20
Payer: MEDICARE

## 2021-11-22 ENCOUNTER — TELEPHONE (OUTPATIENT)
Dept: CARDIOLOGY | Facility: CLINIC | Age: 81
End: 2021-11-22
Payer: MEDICARE

## 2021-11-30 ENCOUNTER — IMMUNIZATION (OUTPATIENT)
Dept: PRIMARY CARE CLINIC | Facility: CLINIC | Age: 81
End: 2021-11-30
Payer: MEDICARE

## 2021-11-30 DIAGNOSIS — Z23 NEED FOR VACCINATION: Primary | ICD-10-CM

## 2021-11-30 PROCEDURE — 0004A COVID-19, MRNA, LNP-S, PF, 30 MCG/0.3 ML DOSE VACCINE: CPT | Mod: CV19,PBBFAC | Performed by: INTERNAL MEDICINE

## 2021-12-02 ENCOUNTER — PATIENT MESSAGE (OUTPATIENT)
Dept: CARDIOLOGY | Facility: CLINIC | Age: 81
End: 2021-12-02
Payer: MEDICARE

## 2021-12-07 DIAGNOSIS — I35.0 NONRHEUMATIC AORTIC VALVE STENOSIS: Primary | ICD-10-CM

## 2021-12-07 DIAGNOSIS — N18.9 CHRONIC KIDNEY DISEASE, UNSPECIFIED CKD STAGE: ICD-10-CM

## 2021-12-20 ENCOUNTER — HOSPITAL ENCOUNTER (OUTPATIENT)
Dept: CARDIOLOGY | Facility: HOSPITAL | Age: 81
Discharge: HOME | End: 2021-12-20
Attending: INTERNAL MEDICINE
Payer: MEDICARE

## 2021-12-20 VITALS
SYSTOLIC BLOOD PRESSURE: 122 MMHG | WEIGHT: 196 LBS | BODY MASS INDEX: 30.76 KG/M2 | DIASTOLIC BLOOD PRESSURE: 75 MMHG | HEART RATE: 89 BPM | HEIGHT: 67 IN

## 2021-12-20 DIAGNOSIS — I35.0 NONRHEUMATIC AORTIC VALVE STENOSIS: ICD-10-CM

## 2021-12-20 LAB
AV INDEX (PROSTH): 0.45
AV MEAN GRADIENT: 27 MMHG
AV PEAK GRADIENT: 37 MMHG
AV VALVE AREA: 1.4 CM2
AV VELOCITY RATIO: 0.39
BSA FOR ECHO PROCEDURE: 2.05 M2
CV ECHO LV RWT: 0.21 CM
DOP CALC AO PEAK VEL: 3.06 M/S
DOP CALC AO VTI: 68.84 CM
DOP CALC LVOT AREA: 3.1 CM2
DOP CALC LVOT DIAMETER: 2 CM
DOP CALC LVOT PEAK VEL: 1.2 M/S
DOP CALC LVOT STROKE VOLUME: 96.52 CM3
DOP CALC MV VTI: 28.38 CM
DOP CALCLVOT PEAK VEL VTI: 30.74 CM
E WAVE DECELERATION TIME: 136.81 MSEC
E/A RATIO: 2.15
E/E' RATIO: 15.73 M/S
ECHO LV POSTERIOR WALL: 0.72 CM (ref 0.6–1.1)
EJECTION FRACTION: 20 %
FRACTIONAL SHORTENING: 12 % (ref 28–44)
HR MV ECHO: 101 BPM
INTERVENTRICULAR SEPTUM: 0.74 CM (ref 0.6–1.1)
LA MAJOR: 5.17 CM
LA MINOR: 4.47 CM
LA WIDTH: 4.49 CM
LEFT ATRIUM SIZE: 4.97 CM
LEFT ATRIUM VOLUME INDEX MOD: 56.5 ML/M2
LEFT ATRIUM VOLUME INDEX: 45.5 ML/M2
LEFT ATRIUM VOLUME MOD: 113 CM3
LEFT ATRIUM VOLUME: 90.94 CM3
LEFT INTERNAL DIMENSION IN SYSTOLE: 5.94 CM (ref 2.1–4)
LEFT VENTRICLE DIASTOLIC VOLUME INDEX: 117.11 ML/M2
LEFT VENTRICLE DIASTOLIC VOLUME: 234.22 ML
LEFT VENTRICLE MASS INDEX: 102 G/M2
LEFT VENTRICLE SYSTOLIC VOLUME INDEX: 88 ML/M2
LEFT VENTRICLE SYSTOLIC VOLUME: 176.08 ML
LEFT VENTRICULAR INTERNAL DIMENSION IN DIASTOLE: 6.74 CM (ref 3.5–6)
LEFT VENTRICULAR MASS: 204.59 G
LV LATERAL E/E' RATIO: 10.73 M/S
LV SEPTAL E/E' RATIO: 29.5 M/S
LVOT MV: 28 CM/S
MV A" WAVE DURATION": 9.13 MSEC
MV MEAN GRADIENT: 2 MMHG
MV PEAK A VEL: 0.55 M/S
MV PEAK E VEL: 1.18 M/S
MV STENOSIS PRESSURE HALF TIME: 39.68 MS
MV VALVE AREA BY CONTINUITY EQUATION: 3.4 CM2
MV VALVE AREA P 1/2 METHOD: 5.54 CM2
PISA MRMAX VEL: 0.04 M/S
PISA RADIUS: 0.77 CM
PISA TR MAX VEL: 3.34 M/S
PISA TR VN NYQUIST: 0 M/S
PROX AORTA: 3.2 CM
PULM VEIN S/D RATIO: 0.94
PV PEAK D VEL: 0.47 M/S
PV PEAK S VEL: 0.44 M/S
RA MAJOR: 4.63 CM
RA PRESSURE: 3 MMHG
RA WIDTH: 4.29 CM
RIGHT ATRIAL AREA: 19.3 CM2
RIGHT VENTRICULAR END-DIASTOLIC DIMENSION: 4.3 CM
RV TISSUE DOPPLER FREE WALL SYSTOLIC VELOCITY 1 (APICAL 4 CHAMBER VIEW): 7 CM/S
SINUS: 3 CM
TDI LATERAL: 0.11 M/S
TDI SEPTAL: 0.04 M/S
TDI: 0.08 M/S
TR MAX PG: 45 MMHG
TRICUSPID ANNULAR PLANE SYSTOLIC EXCURSION: 1.91 CM
TV REST PULMONARY ARTERY PRESSURE: 48 MMHG

## 2021-12-20 PROCEDURE — 93306 TTE W/DOPPLER COMPLETE: CPT

## 2021-12-20 PROCEDURE — 93306 ECHO (CUPID ONLY): ICD-10-PCS | Mod: 26,,, | Performed by: INTERNAL MEDICINE

## 2021-12-20 PROCEDURE — 93306 TTE W/DOPPLER COMPLETE: CPT | Mod: 26,,, | Performed by: INTERNAL MEDICINE

## 2021-12-21 ENCOUNTER — TELEPHONE (OUTPATIENT)
Dept: RESEARCH | Facility: HOSPITAL | Age: 81
End: 2021-12-21
Payer: MEDICARE

## 2021-12-26 ENCOUNTER — PATIENT MESSAGE (OUTPATIENT)
Dept: CARDIOLOGY | Facility: CLINIC | Age: 81
End: 2021-12-26
Payer: MEDICARE

## 2021-12-27 ENCOUNTER — PATIENT MESSAGE (OUTPATIENT)
Dept: CARDIOLOGY | Facility: CLINIC | Age: 81
End: 2021-12-27
Payer: MEDICARE

## 2022-01-01 ENCOUNTER — PATIENT MESSAGE (OUTPATIENT)
Dept: CARDIOLOGY | Facility: CLINIC | Age: 82
End: 2022-01-01
Payer: MEDICARE

## 2022-01-08 ENCOUNTER — PATIENT MESSAGE (OUTPATIENT)
Dept: CARDIOLOGY | Facility: CLINIC | Age: 82
End: 2022-01-08
Payer: MEDICARE

## 2022-01-31 ENCOUNTER — OFFICE VISIT (OUTPATIENT)
Dept: CARDIOLOGY | Facility: CLINIC | Age: 82
End: 2022-01-31
Payer: MEDICARE

## 2022-01-31 VITALS
BODY MASS INDEX: 30.8 KG/M2 | SYSTOLIC BLOOD PRESSURE: 127 MMHG | OXYGEN SATURATION: 97 % | HEART RATE: 85 BPM | DIASTOLIC BLOOD PRESSURE: 69 MMHG | WEIGHT: 203.25 LBS | HEIGHT: 68 IN

## 2022-01-31 DIAGNOSIS — Z95.3 S/P AORTIC VALVE REPLACEMENT WITH BIOPROSTHETIC VALVE: Primary | ICD-10-CM

## 2022-01-31 DIAGNOSIS — I48.0 PAROXYSMAL ATRIAL FIBRILLATION: ICD-10-CM

## 2022-01-31 DIAGNOSIS — I73.9 PERIPHERAL VASCULAR DISEASE: ICD-10-CM

## 2022-01-31 DIAGNOSIS — E11.9 TYPE 2 DIABETES MELLITUS WITHOUT COMPLICATION, WITHOUT LONG-TERM CURRENT USE OF INSULIN: ICD-10-CM

## 2022-01-31 DIAGNOSIS — I10 ESSENTIAL HYPERTENSION: ICD-10-CM

## 2022-01-31 DIAGNOSIS — Z95.1 S/P CABG X 3: ICD-10-CM

## 2022-01-31 PROCEDURE — 1125F PR PAIN SEVERITY QUANTIFIED, PAIN PRESENT: ICD-10-PCS | Mod: CPTII,S$GLB,, | Performed by: INTERNAL MEDICINE

## 2022-01-31 PROCEDURE — 99204 OFFICE O/P NEW MOD 45 MIN: CPT | Mod: S$GLB,,, | Performed by: INTERNAL MEDICINE

## 2022-01-31 PROCEDURE — 99205 PR OFFICE/OUTPT VISIT, NEW, LEVL V, 60-74 MIN: ICD-10-PCS | Mod: S$GLB,,, | Performed by: STUDENT IN AN ORGANIZED HEALTH CARE EDUCATION/TRAINING PROGRAM

## 2022-01-31 PROCEDURE — 3078F DIAST BP <80 MM HG: CPT | Mod: CPTII,S$GLB,, | Performed by: INTERNAL MEDICINE

## 2022-01-31 PROCEDURE — 1159F PR MEDICATION LIST DOCUMENTED IN MEDICAL RECORD: ICD-10-PCS | Mod: CPTII,S$GLB,, | Performed by: INTERNAL MEDICINE

## 2022-01-31 PROCEDURE — 99999 PR PBB SHADOW E&M-EST. PATIENT-LVL III: ICD-10-PCS | Mod: PBBFAC,,, | Performed by: INTERNAL MEDICINE

## 2022-01-31 PROCEDURE — 3074F SYST BP LT 130 MM HG: CPT | Mod: CPTII,S$GLB,, | Performed by: INTERNAL MEDICINE

## 2022-01-31 PROCEDURE — 99999 PR PBB SHADOW E&M-EST. PATIENT-LVL III: CPT | Mod: PBBFAC,,, | Performed by: INTERNAL MEDICINE

## 2022-01-31 PROCEDURE — 99205 OFFICE O/P NEW HI 60 MIN: CPT | Mod: S$GLB,,, | Performed by: STUDENT IN AN ORGANIZED HEALTH CARE EDUCATION/TRAINING PROGRAM

## 2022-01-31 PROCEDURE — 3078F PR MOST RECENT DIASTOLIC BLOOD PRESSURE < 80 MM HG: ICD-10-PCS | Mod: CPTII,S$GLB,, | Performed by: INTERNAL MEDICINE

## 2022-01-31 PROCEDURE — 3074F PR MOST RECENT SYSTOLIC BLOOD PRESSURE < 130 MM HG: ICD-10-PCS | Mod: CPTII,S$GLB,, | Performed by: INTERNAL MEDICINE

## 2022-01-31 PROCEDURE — 99204 PR OFFICE/OUTPT VISIT, NEW, LEVL IV, 45-59 MIN: ICD-10-PCS | Mod: S$GLB,,, | Performed by: INTERNAL MEDICINE

## 2022-01-31 PROCEDURE — 1159F MED LIST DOCD IN RCRD: CPT | Mod: CPTII,S$GLB,, | Performed by: INTERNAL MEDICINE

## 2022-01-31 PROCEDURE — 1125F AMNT PAIN NOTED PAIN PRSNT: CPT | Mod: CPTII,S$GLB,, | Performed by: INTERNAL MEDICINE

## 2022-01-31 RX ORDER — ERGOCALCIFEROL 1.25 MG/1
50000 CAPSULE ORAL
COMMUNITY
Start: 2021-12-30

## 2022-01-31 RX ORDER — SACUBITRIL AND VALSARTAN 24; 26 MG/1; MG/1
1 TABLET, FILM COATED ORAL DAILY
COMMUNITY

## 2022-01-31 NOTE — PROGRESS NOTES
Interventional Cardiology Clinic Note  Reason for Visit: Prosthetic valve dysfunction  Referring physician:Self referral, Rex Mccallum  Last office visit: New patient    HPI:   Mr. Will Kan is a 81 y.o. gentleman with history of CAD s/p CABG in 2007, cardiomyopathy, Bioprosthetic aortic valve with Gupta Perimount 2800,21 mm (True ID [id number]), PAF on eliquis, DM, HTN, Hyperlipidemia, spinal stenosis, CKD-III who presents for evaluation of failing bioprosthetic Aortic valve. Patient reports feeling fatigued on walking long distances and some shortness of breath. He does have leg swelling. Denies any PND, orthopnea, chest pain. Patient had work up done at [facility], including a TTE that showed EF 25-30%, MG=22, Vmax 3.1, DERREK 0.9. A DSE performed showed increase in gradient to 42. He was scheduled for Naila TAVR in [facility] and in the procedure there was no evidence of aortic bioprosthesis failure on cath simultaneous gradients and a RO showed normal prosthetic valve function with good leaflet excursion and no AI. His TAVR was aborted. He presents here for 2nd opinion.    Will Kan is a 81 y.o. male referred by Dr Correa  for evaluation of failing Bioprosthetic Aortic Valve (NYHA Class  II sx).    The patient has undergone the following TAVR work-up:    ECHO (Date ): DERREK= 0.9 cm2, MG= mmHg, [place]=  m/s, EF= 25-30%.    [facility] (Date 09/23/21 ): patent LIMA-LAD, SVG to D1, OM1, PDA   STS: 7.2 %    Frailty:1 /4 ()   Iliacs are >on R and > on L    LVOT area by CTA is 2.02 cm2 (18.1 mm X 14.1 mm) and Avg Diameter is 16.1 from EJ   Incidental findings on CT: outside CT   CT Surgery risk assessment:Inoperable risk, per Dr Marks   Rhythm issues: 1st degree AVB, LBBB   PFTs: FEV1 58 % predicted, DLCO 76 % predicted.   Comorbidities: Redo CABG, CAD      Will Kan is a  mm  valve candidate via  access.    ROS:    Review of Systems   Constitutional: Negative for decreased  appetite and fever.   HENT: Negative for hoarse voice and nosebleeds.    Eyes: Negative for blurred vision and photophobia.   Cardiovascular: Positive for dyspnea on exertion and leg swelling. Negative for chest pain, irregular heartbeat, orthopnea and palpitations.   Respiratory: Negative for cough and shortness of breath.    Hematologic/Lymphatic: Negative for bleeding problem. Does not bruise/bleed easily.   Skin: Negative for itching and rash.   Musculoskeletal: Negative for joint swelling and neck pain.   Gastrointestinal: Negative for abdominal pain, hematemesis, hematochezia, nausea and vomiting.   Genitourinary: Negative for hematuria.   Neurological: Negative for light-headedness and seizures.   Psychiatric/Behavioral: Negative for altered mental status. The patient is not nervous/anxious.      PMH:     Past Medical History:   Diagnosis Date    CHF (congestive heart failure)     Diabetes mellitus      Past Surgical History:   Procedure Laterality Date    TRANSFORAMINAL EPIDURAL INJECTION OF STEROID Bilateral 6/19/2019    Procedure: INJECTION, STEROID, EPIDURAL, TRANSFORAMINAL APPROACH, L3-L4 need consent Plavix;  Surgeon: Jason Shell MD;  [place] [facility] [place] [facility] [place]  Service: Pain Management;  Laterality: Bilateral;  PLAVIX CLEARANCE SCANNED TO MEDIA    TRANSFORAMINAL EPIDURAL INJECTION OF STEROID Bilateral 7/31/2019    Procedure: INJECTION, STEROID, EPIDURAL, TRANSFORAMINAL APPROACH, ARMINDA L4-L5 Pt. cleared to hold Plavix 7 days and Pletal 3 days;  Surgeon: Jason Shell MD;  Location: [facility];  Service: Pain Management;  Laterality: Bilateral;     Allergies:   Review of patient's allergies indicates:  No Known Allergies  Medications:     Current Outpatient Medications on File Prior to Visit   Medication Sig Dispense Refill    apixaban (ELIQUIS) 5 mg Tab Take 5 mg by mouth 2 (two) times daily.      clopidogrel (PLAVIX) 75 mg tablet clopidogrel 75 mg tablet      eplerenone (INSPRA) 25 MG  Tab Take by mouth.      ergocalciferol (ERGOCALCIFEROL) 50,000 unit Cap Take 50,000 Units by mouth every 7 days.      fluticasone propionate (FLONASE) 50 mcg/actuation nasal spray fluticasone propionate 50 mcg/actuation nasal spray,suspension      furosemide (LASIX) 40 MG tablet Take 40 mg by mouth once daily.      gabapentin (NEURONTIN) 300 MG capsule gabapentin 300 mg capsule   TAKE 1 CAPSULE BY MOUTH THREE TIMES A DAY AS NEEDED      glimepiride (AMARYL) 2 MG tablet Take 1 mg by mouth daily with breakfast.      metoprolol tartrate (LOPRESSOR) 25 MG tablet Take 25 mg by mouth once daily.      pravastatin (PRAVACHOL) 40 MG tablet Take 40 mg by mouth once daily.      sacubitriL-valsartan (ENTRESTO) 24-26 mg per tablet Take 1 tablet by mouth Daily.      aspirin (ECOTRIN) 81 MG EC tablet Take 81 mg by mouth.      traMADol (ULTRAM) 50 mg tablet tramadol 50 mg tablet       Current Facility-Administered Medications on File Prior to Visit   Medication Dose Route Frequency Provider Last Rate Last Admin    0.9%  NaCl infusion  500 mL Intravenous Continuous Yamilex Schmidt MD         Social History:     Social History     Tobacco Use    Smoking status: Former Smoker    Smokeless tobacco: Never Used    Tobacco comment: 20 years ago   Substance Use Topics    Alcohol use: Yes     Comment: socially     Family History:     Family History   Problem Relation Age of Onset    No Known Problems Mother     No Known Problems Father     No Known Problems Sister     Heart disease Brother     Diabetes Brother     No Known Problems Maternal Aunt     No Known Problems Maternal Uncle     No Known Problems Paternal Aunt     No Known Problems Paternal Uncle     No Known Problems Maternal Grandmother     No Known Problems Maternal Grandfather     No Known Problems Paternal Grandmother     No Known Problems Paternal Grandfather     Anemia Neg Hx     Arrhythmia Neg Hx     Asthma Neg Hx     Clotting disorder Neg Hx   "   Fainting Neg Hx     Heart attack Neg Hx     Heart failure Neg Hx     Hyperlipidemia Neg Hx     Hypertension Neg Hx     Stroke Neg Hx     Atrial Septal Defect Neg Hx      Physical Exam:   /69 (BP Location: Left arm, Patient Position: Sitting, BP Method: Large (Automatic))   Pulse 85   Ht 5' 8" (1.727 m)   Wt 92.2 kg (203 lb 4.2 oz)   SpO2 97%   BMI 30.91 kg/m²      Physical Exam  General: alert, awake and oriented x 3  Eyes:PERRL.   Neck:no JVD   Lungs:  clear to auscultation bilaterally   Cardiovascular: Heart: regular rate and rhythm, S1, S2 normal, systolic ejection murmur  Chest Wall: no tenderness.   Pulses-2+ radial, femoral, DP, PT b/l  Extremities: no cyanosis + edema.   Abdomen/Rectal: Abdomen: soft, non-tender non-distented; bowel sounds normal  Neurologic: Normal strength and tone. No focal numbness or weakness  Labs:     Lab Results   Component Value Date     12/20/2021    K 5.1 12/20/2021     12/20/2021    CO2 28 12/20/2021    BUN 19 12/20/2021    CREATININE 1.4 12/20/2021    ANIONGAP 7 (L) 12/20/2021     No results found for: HGBA1C  No results found for: BNP, BNPTRIAGEBLO Lab Results   Component Value Date    WBC 5.64 12/20/2021    HGB 13.1 (L) 12/20/2021    HCT 41.2 12/20/2021     (L) 12/20/2021    GRAN 2.7 12/20/2021    GRAN 48.2 12/20/2021     No results found for: CHOL, HDL, LDLCALC, TRIG           Imaging:     EF   Date Value Ref Range Status   12/20/2021 20 % Final       Assessment:     1. S/P aortic valve replacement with bioprosthetic valve   -RO and TTE images reviewed from [facility] and patient has normal functioning surgical bioprosthetic valve with good leaflet excursion  -Currently Naila TAVR is not indicated or required     2. S/P CABG x 4 (LIMA-LAD, SVG-D1, SVG-OM1, SVG-RCA)  -patent grafts on most recent angiogram  -asymptomatic  -continue current medications     3. Type 2 diabetes mellitus without complication, without long-term current use of insulin "   -continue current meds     4. Peripheral vascular disease   -continue plavix and statin     5. Essential hypertension   -BP well controlled  -continue current meds     6. Paroxysmal atrial fibrillation   -continue eliquis  -continue BB       Plan:   -RO and TTE images reviewed from [facility] and patient has normal functioning surgical bioprosthetic valve with good leaflet excursion and no AI  -Currently Naila TAVR is not indicated or required  -Continue follow up with primary cardiologist  -follow up PRN.          MIKE CALDERON  INTERVENTIONAL CARDIOLOGY FELLOW, PGY 7  [phone]      Staff:  I have personally taken the history and examined this patient and agree with the fellow's note as stated above and amended it accordingly :-)  Patient doesn't have AS by RO or cath and doesn't need TAVR at this time.  Records and RO from [facility] Dr. Correa.

## 2022-02-01 NOTE — PROGRESS NOTES
Patient was seen in clinic with Dr Ortega. RO reviewed and we are in agreement that the patient's current bioprosthesis is functioning well. He does not have severe AS and is not a candidate for TAVR at this time. The patient was counseled to return if his cardiologist determined he has worsening AS.    Sky Drew MD  CV Surgery

## 2022-02-02 ENCOUNTER — PATIENT MESSAGE (OUTPATIENT)
Dept: CARDIOLOGY | Facility: CLINIC | Age: 82
End: 2022-02-02
Payer: MEDICARE

## 2022-03-28 ENCOUNTER — TELEPHONE (OUTPATIENT)
Dept: VASCULAR SURGERY | Facility: CLINIC | Age: 82
End: 2022-03-28
Payer: MEDICARE

## 2022-03-28 DIAGNOSIS — I73.9 PAD (PERIPHERAL ARTERY DISEASE): Primary | ICD-10-CM

## 2022-03-28 NOTE — TELEPHONE ENCOUNTER
Received call from patient stating he would like to be seen for leg blockages. States he had angiogram in 2019 which showed severe blockage in his legs but was never referred to vascular surgeon. Now he has wounds on his legs that aren't healing and would like to be seen. Next day ppointment scheduled, pt verified.

## 2022-03-29 ENCOUNTER — INITIAL CONSULT (OUTPATIENT)
Dept: VASCULAR SURGERY | Facility: CLINIC | Age: 82
End: 2022-03-29
Payer: MEDICARE

## 2022-03-29 ENCOUNTER — HOSPITAL ENCOUNTER (OUTPATIENT)
Dept: VASCULAR SURGERY | Facility: CLINIC | Age: 82
Discharge: HOME | End: 2022-03-29
Attending: SURGERY
Payer: MEDICARE

## 2022-03-29 VITALS
WEIGHT: 200.63 LBS | SYSTOLIC BLOOD PRESSURE: 139 MMHG | TEMPERATURE: 98 F | HEIGHT: 67 IN | HEART RATE: 79 BPM | BODY MASS INDEX: 31.49 KG/M2 | DIASTOLIC BLOOD PRESSURE: 64 MMHG

## 2022-03-29 DIAGNOSIS — I73.9 PAD (PERIPHERAL ARTERY DISEASE): ICD-10-CM

## 2022-03-29 DIAGNOSIS — I87.2 CHRONIC VENOUS INSUFFICIENCY: ICD-10-CM

## 2022-03-29 PROBLEM — I70.219 ATHEROSCLEROTIC PERIPHERAL VASCULAR DISEASE WITH INTERMITTENT CLAUDICATION: Status: ACTIVE | Noted: 2019-05-23

## 2022-03-29 PROCEDURE — 93923 UPR/LXTR ART STDY 3+ LVLS: CPT | Mod: S$GLB,,, | Performed by: SURGERY

## 2022-03-29 PROCEDURE — 99999 PR PBB SHADOW E&M-EST. PATIENT-LVL IV: ICD-10-PCS | Mod: PBBFAC,,, | Performed by: SURGERY

## 2022-03-29 PROCEDURE — 99999 PR PBB SHADOW E&M-EST. PATIENT-LVL IV: CPT | Mod: PBBFAC,,, | Performed by: SURGERY

## 2022-03-29 PROCEDURE — 99205 OFFICE O/P NEW HI 60 MIN: CPT | Mod: S$GLB,,, | Performed by: SURGERY

## 2022-03-29 PROCEDURE — 99205 PR OFFICE/OUTPT VISIT, NEW, LEVL V, 60-74 MIN: ICD-10-PCS | Mod: S$GLB,,, | Performed by: SURGERY

## 2022-03-29 PROCEDURE — 93923 PR NON-INVASIVE PHYSIOLOGIC STUDY EXTREMITY 3 LEVELS: ICD-10-PCS | Mod: S$GLB,,, | Performed by: SURGERY

## 2022-03-29 RX ORDER — CEPHALEXIN 500 MG/1
500 CAPSULE ORAL EVERY 8 HOURS
COMMUNITY
Start: 2022-03-25 | End: 2024-03-25

## 2022-03-29 NOTE — PROGRESS NOTES
VASCULAR SURGERY SERVICE    REFERRING DOCTOR: LUCIANO Nichols MD    CHIEF COMPLAINT:  Right leg claudication and calf ulcerations    HISTORY OF PRESENT ILLNESS: Will Kan is a 81 y.o. male with history of coronary disease status post CABG and aortic valve placement 2007, known EF of 20%, who has 2 presenting complaints, that of right calf claudication after walking 10 minutes on a treadmill.  Notably he does not have any claudication whatsoever in ambulation around his house or outside of his house.  He denies any foot pain at rest.  He reports having prior arteriogram years ago and had a occlusion that could not be treated per his report.    Or recently, he noted a ulceration present of the lower medial right calf.  He says is actually improved significantly over the last week.    Past Medical History:   Diagnosis Date    CHF (congestive heart failure)     Diabetes mellitus        Past Surgical History:   Procedure Laterality Date    TRANSFORAMINAL EPIDURAL INJECTION OF STEROID Bilateral 6/19/2019    Procedure: INJECTION, STEROID, EPIDURAL, TRANSFORAMINAL APPROACH, L3-L4 need consent Plavix;  Surgeon: Jason Shell MD;  Location: [facility]IN [facility];  Service: Pain Management;  Laterality: Bilateral;  PLAVIX CLEARANCE SCANNED TO MEDIA    TRANSFORAMINAL EPIDURAL INJECTION OF STEROID Bilateral 7/31/2019    Procedure: INJECTION, STEROID, EPIDURAL, TRANSFORAMINAL APPROACH, ARMINDA L4-L5 Pt. cleared to hold Plavix 7 days and Pletal 3 days;  Surgeon: Jason Shell MD;  Location: [facility];  Service: Pain Management;  Laterality: Bilateral;         Current Outpatient Medications:     apixaban (ELIQUIS) 5 mg Tab, Take 5 mg by mouth 2 (two) times daily., Disp: , Rfl:     cephALEXin (KEFLEX) 500 MG capsule, Take 500 mg by mouth every 8 (eight) hours., Disp: , Rfl:     clopidogrel (PLAVIX) 75 mg tablet, clopidogrel 75 mg tablet, Disp: , Rfl:     eplerenone (INSPRA) 25 MG Tab, Take by mouth., Disp: , Rfl:      ergocalciferol (ERGOCALCIFEROL) 50,000 unit Cap, Take 50,000 Units by mouth every 7 days., Disp: , Rfl:     fluticasone propionate (FLONASE) 50 mcg/actuation nasal spray, fluticasone propionate 50 mcg/actuation nasal spray,suspension, Disp: , Rfl:     furosemide (LASIX) 40 MG tablet, Take 40 mg by mouth once daily., Disp: , Rfl:     gabapentin (NEURONTIN) 300 MG capsule, gabapentin 300 mg capsule  TAKE 1 CAPSULE BY MOUTH THREE TIMES A DAY AS NEEDED, Disp: , Rfl:     glimepiride (AMARYL) 2 MG tablet, Take 1 mg by mouth daily with breakfast., Disp: , Rfl:     metoprolol tartrate (LOPRESSOR) 25 MG tablet, Take 25 mg by mouth once daily., Disp: , Rfl:     pravastatin (PRAVACHOL) 40 MG tablet, Take 40 mg by mouth once daily., Disp: , Rfl:     sacubitriL-valsartan (ENTRESTO) 24-26 mg per tablet, Take 1 tablet by mouth Daily., Disp: , Rfl:     traMADol (ULTRAM) 50 mg tablet, tramadol 50 mg tablet, Disp: , Rfl:   No current facility-administered medications for this visit.    Facility-Administered Medications Ordered in Other Visits:     0.9%  NaCl infusion, 500 mL, Intravenous, Continuous, Yamilex Schmidt MD    Review of patient's allergies indicates:  No Known Allergies    Family History   Problem Relation Age of Onset    No Known Problems Mother     No Known Problems Father     No Known Problems Sister     Heart disease Brother     Diabetes Brother     No Known Problems Maternal Aunt     No Known Problems Maternal Uncle     No Known Problems Paternal Aunt     No Known Problems Paternal Uncle     No Known Problems Maternal Grandmother     No Known Problems Maternal Grandfather     No Known Problems Paternal Grandmother     No Known Problems Paternal Grandfather     Anemia Neg Hx     Arrhythmia Neg Hx     Asthma Neg Hx     Clotting disorder Neg Hx     Fainting Neg Hx     Heart attack Neg Hx     Heart failure Neg Hx     Hyperlipidemia Neg Hx     Hypertension Neg Hx     Stroke Neg Hx      "Atrial Septal Defect Neg Hx        Social History     Tobacco Use    Smoking status: Former Smoker    Smokeless tobacco: Never Used    Tobacco comment: 20 years ago   Substance Use Topics    Alcohol use: Yes     Comment: socially    Drug use: Never       REVIEW OF SYSTEMS:  General: No chills, fever, malaise, changes in weight  HEENT: No visual changes, difficulty hearing  Cardiovascular: No chest pain, palpitations, claudication  Pulmonary: No dyspnea, cough, wheezing  Gastrointestinal: No nausea, vomiting, diarrhea, constipation  Genitourinary: No dysuria, low urine output, hematuria  Endocrine: No polydipsia, polyphagia  Hematologic: No fatigue with exertion, pica, pallor  Musculoskeletal: No extremity or joint pain, no back pain, no difficulty with ambulation  Neurologic: no seizures, no headaches, no weakness  Psychiatric: no mood disturbance      PHYSICAL EXAM:   /64 (BP Location: Right arm, Patient Position: Sitting, BP Method: Large (Automatic))   Pulse 79   Temp 98.2 °F (36.8 °C) (Oral)   Ht 5' 7" (1.702 m)   Wt 91 kg (200 lb 9.9 oz)   BMI 31.42 kg/m²   Constitutional:  Alert,   Well-appearing  In no distress.   Neurological: Normal speech  no focal findings  CN II - XII grossly intact.    Psychiatric: Mood and affect appropriate and symmetric.   HEENT: Normocephalic / atraumatic  PERRLA  Midline trachea  No scars across the neck   Cardiac: Regular rate and rhythm.   Pulmonary: Normal pulmonary effort.   Abdomen: Soft, not distended.     Skin: Warm and well perfused.    Vascular:  2+ femoral pulses bilaterally, nonpalpable pedal pulses but good Doppler signals present   Extremities/  Musculoskeletal: No edema.   Bilateral lower extremities show scattered varicosities and mild pitting edema.  There is some hyperpigmentation in the gaiter area.  There was a 5 mm scab the medial right calf in the distal 3rd.  Feet are pink and well perfused without lesions ulcerations or petechiae "     IMAGING:  ABIs are 0.61 on the right and 1.04 on the left    IMPRESSION:   1.  Moderate right peripheral arterial occlusive disease with on limiting claudication, only noted on extended treadmill use.  2.   Healing right calf ulceration.  I have reassured him this is not due to his moderate arterial occlusive disease, but due to venous insufficiency    PLAN:   1. Continue current treatment for atherosclerotic disease with statin and antiplatelet agents.  Continue walking program  2.  Compression hose for chronic venous insufficiency    ELYSIA Lora III, MD, FACS  Professor and Chief, Vascular and Endovascular Surgery

## 2022-03-29 NOTE — LETTER
[facility] - Vascular Surg 5th Fl  [address]  [facility] [address]  Phone: [phone]  Fax: [phone] April 14, 2022      Mayco Cortez MD  [address]  [address]  [place] [facility] [address]    Patient: Will Kan   MR Number: [id number]   YOB: 1940   Date of Visit: 3/29/2022     Dear Dr. Cortez:    Thank you for referring Will Kan to me for evaluation. Attached are the relevant portions of my assessment and plan of care.    If you have questions, please do not hesitate to call me. I look forward to following Will along with you.    Sincerely,        JEFF Lora III, MD   Professor and Chief  Vascular and Endovascular Surgery  Vice-Chair, Department of Surgery  [facility]

## 2022-03-31 ENCOUNTER — TELEPHONE (OUTPATIENT)
Dept: VASCULAR SURGERY | Facility: CLINIC | Age: 82
End: 2022-03-31
Payer: MEDICARE

## 2022-03-31 NOTE — TELEPHONE ENCOUNTER
Contacted patient in response to message. States he attempted to get his compression stockings but was told that he would need a medical necessity sent form sent to [employer]. Form completed and faxed to [employer]. ----- Message from Carrie Ring sent at 3/31/2022  2:31 PM CDT -----  Contact: pt  Pt requesting call back regarding compression stockings . Please call           Confirmed patient's contact info below:  Contact Name: Will Kan  Phone Number: [phone]

## 2022-04-07 ENCOUNTER — TELEPHONE (OUTPATIENT)
Dept: VASCULAR SURGERY | Facility: CLINIC | Age: 82
End: 2022-04-07
Payer: MEDICARE

## 2022-04-07 NOTE — TELEPHONE ENCOUNTER
Contacted [facility]ron in response to message. States medical necessity form was received but cannot be approved without prescription. Explained that prescription was physically given to patient so nurse no longer has access to this. Barron states pt can have prescription faxed to [phone]. Contacted patient to explain that [facility] needs prescription before request can be approved and notified patient that he can fax this to [employer]ron. Fax number given. Pt verbalized understanding and states he will have this faxed.----- Message from Shanice Minor sent at 4/7/2022  8:17 AM CDT -----  Contact: Barron ([facility])   Barron with [employer] requesting a signed order faxed over for compression stockings for pt.     Confirmed contact below:  Contact Name: [employer]  Phone Number: [phone]  Fax Number: [phone]

## 2022-04-21 ENCOUNTER — PATIENT MESSAGE (OUTPATIENT)
Dept: VASCULAR SURGERY | Facility: CLINIC | Age: 82
End: 2022-04-21
Payer: MEDICARE

## 2022-10-06 ENCOUNTER — IMMUNIZATION (OUTPATIENT)
Dept: INTERNAL MEDICINE | Facility: CLINIC | Age: 82
End: 2022-10-06
Payer: MEDICARE

## 2022-10-06 PROCEDURE — 90694 VACC AIIV4 NO PRSRV 0.5ML IM: CPT | Mod: S$GLB,,, | Performed by: INTERNAL MEDICINE

## 2022-10-06 PROCEDURE — 90694 FLU VACCINE - QUADRIVALENT - ADJUVANTED: ICD-10-PCS | Mod: S$GLB,,, | Performed by: INTERNAL MEDICINE

## 2022-10-06 PROCEDURE — G0008 FLU VACCINE - QUADRIVALENT - ADJUVANTED: ICD-10-PCS | Mod: S$GLB,,, | Performed by: INTERNAL MEDICINE

## 2022-10-06 PROCEDURE — G0008 ADMIN INFLUENZA VIRUS VAC: HCPCS | Mod: S$GLB,,, | Performed by: INTERNAL MEDICINE

## 2022-12-01 ENCOUNTER — TELEPHONE (OUTPATIENT)
Dept: PAIN MEDICINE | Facility: CLINIC | Age: 82
End: 2022-12-01
Payer: MEDICARE

## 2022-12-01 NOTE — TELEPHONE ENCOUNTER
----- Message from Cristy Collins sent at 12/1/2022  9:52 AM CST -----  Type: Appointment Request      Name of Caller:Pt   When is the first available appointment?03/06/23  Symptoms:back pain   Best Call Back Number:[phone]  Additional Information: Portal Message        Message  Appointment Request From: Will Kan  With Provider: Jason Shell MD [[facility] Pain Management]  Preferred Date Range: Any  Preferred Times: Any Time  Reason for visit: Back pain    Comments:  Pain management

## 2022-12-04 ENCOUNTER — PATIENT MESSAGE (OUTPATIENT)
Dept: PAIN MEDICINE | Facility: CLINIC | Age: 82
End: 2022-12-04
Payer: MEDICARE

## 2022-12-05 ENCOUNTER — TELEPHONE (OUTPATIENT)
Dept: PAIN MEDICINE | Facility: CLINIC | Age: 82
End: 2022-12-05
Payer: MEDICARE

## 2022-12-05 NOTE — TELEPHONE ENCOUNTER
----- Message from Lalo Camara sent at 12/5/2022  1:44 PM CST -----  Name of Who is Calling: TACOS MYERS [[id number]]            What is the request in detail: Patient is requesting call back to get injection               Can the clinic reply by [facility]: no              What Number to [facility] Back [facility]: [phone]

## 2022-12-07 ENCOUNTER — PATIENT MESSAGE (OUTPATIENT)
Dept: PAIN MEDICINE | Facility: CLINIC | Age: 82
End: 2022-12-07
Payer: MEDICARE

## 2022-12-07 ENCOUNTER — TELEPHONE (OUTPATIENT)
Dept: PAIN MEDICINE | Facility: CLINIC | Age: 82
End: 2022-12-07
Payer: MEDICARE

## 2022-12-07 NOTE — TELEPHONE ENCOUNTER
----- Message from Dari Batista MA sent at 12/7/2022 10:12 AM CST -----  Good morning,    Mr. Gaulon called us in [facility] needing help with getting a sooner appt with . He stated he has contacted you all but has not heard back. I did tell him it looks like you all have been trying to reach him but his phone just ringing or is busy. He said he might have it on silent or something. Can you please reach out to him not to schedule sooner, he told me he will leave his phone on loud.     Thank you   Dari  [facility]  Ext [phone]

## 2022-12-07 NOTE — TELEPHONE ENCOUNTER
Patient was contacted and offered an appt with  at the [place] location accepted this appt and was scheduled

## 2022-12-15 ENCOUNTER — HOSPITAL ENCOUNTER (OUTPATIENT)
Dept: RADIOLOGY | Facility: HOSPITAL | Age: 82
Discharge: HOME | End: 2022-12-15
Attending: STUDENT IN AN ORGANIZED HEALTH CARE EDUCATION/TRAINING PROGRAM
Payer: MEDICARE

## 2022-12-15 ENCOUNTER — TELEPHONE (OUTPATIENT)
Dept: PAIN MEDICINE | Facility: CLINIC | Age: 82
End: 2022-12-15
Payer: MEDICARE

## 2022-12-15 ENCOUNTER — OFFICE VISIT (OUTPATIENT)
Dept: PAIN MEDICINE | Facility: CLINIC | Age: 82
End: 2022-12-15
Payer: MEDICARE

## 2022-12-15 VITALS
HEART RATE: 86 BPM | BODY MASS INDEX: 31.42 KG/M2 | DIASTOLIC BLOOD PRESSURE: 68 MMHG | WEIGHT: 200.63 LBS | SYSTOLIC BLOOD PRESSURE: 126 MMHG

## 2022-12-15 DIAGNOSIS — M51.36 DDD (DEGENERATIVE DISC DISEASE), LUMBAR: Primary | ICD-10-CM

## 2022-12-15 DIAGNOSIS — M51.36 DDD (DEGENERATIVE DISC DISEASE), LUMBAR: ICD-10-CM

## 2022-12-15 DIAGNOSIS — M54.16 LUMBAR RADICULOPATHY: Primary | ICD-10-CM

## 2022-12-15 DIAGNOSIS — M54.16 LUMBAR RADICULOPATHY: ICD-10-CM

## 2022-12-15 PROCEDURE — 1125F AMNT PAIN NOTED PAIN PRSNT: CPT | Mod: CPTII,S$GLB,, | Performed by: ANESTHESIOLOGY

## 2022-12-15 PROCEDURE — 3074F SYST BP LT 130 MM HG: CPT | Mod: CPTII,S$GLB,, | Performed by: ANESTHESIOLOGY

## 2022-12-15 PROCEDURE — 3074F PR MOST RECENT SYSTOLIC BLOOD PRESSURE < 130 MM HG: ICD-10-PCS | Mod: CPTII,S$GLB,, | Performed by: ANESTHESIOLOGY

## 2022-12-15 PROCEDURE — 3078F DIAST BP <80 MM HG: CPT | Mod: CPTII,S$GLB,, | Performed by: ANESTHESIOLOGY

## 2022-12-15 PROCEDURE — 72110 X-RAY EXAM L-2 SPINE 4/>VWS: CPT | Mod: TC,FY

## 2022-12-15 PROCEDURE — 3078F PR MOST RECENT DIASTOLIC BLOOD PRESSURE < 80 MM HG: ICD-10-PCS | Mod: CPTII,S$GLB,, | Performed by: ANESTHESIOLOGY

## 2022-12-15 PROCEDURE — 1160F PR REVIEW ALL MEDS BY PRESCRIBER/CLIN PHARMACIST DOCUMENTED: ICD-10-PCS | Mod: CPTII,S$GLB,, | Performed by: ANESTHESIOLOGY

## 2022-12-15 PROCEDURE — 1159F MED LIST DOCD IN RCRD: CPT | Mod: CPTII,S$GLB,, | Performed by: ANESTHESIOLOGY

## 2022-12-15 PROCEDURE — 99999 PR PBB SHADOW E&M-EST. PATIENT-LVL III: ICD-10-PCS | Mod: PBBFAC,,, | Performed by: ANESTHESIOLOGY

## 2022-12-15 PROCEDURE — 72110 XR LUMBAR SPINE COMPLETE 5 VIEW: ICD-10-PCS | Mod: 26,,, | Performed by: RADIOLOGY

## 2022-12-15 PROCEDURE — 72110 X-RAY EXAM L-2 SPINE 4/>VWS: CPT | Mod: 26,,, | Performed by: RADIOLOGY

## 2022-12-15 PROCEDURE — 1125F PR PAIN SEVERITY QUANTIFIED, PAIN PRESENT: ICD-10-PCS | Mod: CPTII,S$GLB,, | Performed by: ANESTHESIOLOGY

## 2022-12-15 PROCEDURE — 1160F RVW MEDS BY RX/DR IN RCRD: CPT | Mod: CPTII,S$GLB,, | Performed by: ANESTHESIOLOGY

## 2022-12-15 PROCEDURE — 1159F PR MEDICATION LIST DOCUMENTED IN MEDICAL RECORD: ICD-10-PCS | Mod: CPTII,S$GLB,, | Performed by: ANESTHESIOLOGY

## 2022-12-15 PROCEDURE — 99999 PR PBB SHADOW E&M-EST. PATIENT-LVL III: CPT | Mod: PBBFAC,,, | Performed by: ANESTHESIOLOGY

## 2022-12-15 PROCEDURE — 99214 PR OFFICE/OUTPT VISIT, EST, LEVL IV, 30-39 MIN: ICD-10-PCS | Mod: GC,S$GLB,, | Performed by: ANESTHESIOLOGY

## 2022-12-15 PROCEDURE — 99214 OFFICE O/P EST MOD 30 MIN: CPT | Mod: GC,S$GLB,, | Performed by: ANESTHESIOLOGY

## 2022-12-15 NOTE — PROGRESS NOTES
Chronic patient Established Note (Follow up visit)      SUBJECTIVE:    Interval History 12/15/2022:  Will Kan presents to clinic today to discuss low back and left leg pain.  Patient had an L4/5 TFESI on 7/31/2019 and he had >80% pain relief for 2 years.  He states the pain started returning about 6 months ago and now is back to being severe pain.  It is similar in location and quality as it was prior to his procedure in 2019.  Pain is worse with activity.  He denies fevers/chills/weight loss/progressive weakness/incontinence.  Patient desires a repeat L4/5 TFESI.     Interval History 7/7/2020:  Will Kan presents tele-medicine appointment for a follow-up appointment for new hip pain. Since the last visit, Will Kan states the pain has been worsening. Current pain intensity is 7/10.   Patient states his back pain is much better, he wanted to discuss with us this new  hip pain as he been evaluated in injected by orthopedics with no improvement     Interval History 8/12/2019:  The patient returns to clinic today for follow up. He is s/p bilateral L4 TF TRIP on 7/31/2019. He reports 50% relief of his low back and left leg pain. He reports no relief of his right foot pain and numbness. He continues to report right foot pain and numbness that worsens with standing and walking. He is scheduled to see his cardiologist in the next few weeks for an ultrasound and KIMBERLY. He also reports worsening ankle edema bilaterally. He continues to take Gabapentin with some benefit. He denies any other health changes. His pain today is 5/10.     Interval History 7/3/2019:  The patient returns to clinic today for follow up. He is s/p bilateral L3 TF TRIP on 6/19/2019. He reports significant relief of his back pain. He continues to report pain that radiates into the front and side of his left leg to his knee. He denies any right radicular leg pain. He does report burning, tingling, and numbness to the top of his right  foot. His pain is worse with prolonged activity and at night. He continues to take Gabapentin with benefit. He denies any other health changes. His pain today is 5/10.     HPI:  Will Kan presents to the clinic for the evaluation of lower back pain. The pain started 3 years ago following non-related imjury and symptoms have been worsening. More recently patient underwent angiogram in 02/2019 and the back pain got acutely worse after laying flat for the required amount of time. The pain is located in the lower back area and radiates to the bilateral hip, worse on the left. The pain is described as aching and numbing and is rated as 4/10. The pain is rated with a score of  4/10 on the BEST day and a score of 10/10 on the WORST day. Symptoms interfere with activities around the house such as yard work as well as exercise. The pain is exacerbated by bending, walking and getting out of bed/chair. The pain is mitigated by medications and rest. He reports spending 4 hours per day reclining. The patient reports 8 hours of uninterrupted sleep per night.     Patient denies night fever/night sweats, urinary incontinence, bowel incontinence, significant weight loss, significant motor weakness and loss of sensations.     Brief Hx:  Will Kan is a 78 y.o. male w/ a significant PMHx of HTN, HLD, PVD, CAD s/p 3v CABG in 2007, and T2DM. Patient presents to clinic for lower back pain that radiates down to the hip and is worse on the left side. On the left side pain shoots down the leg and stops at the knee.   Pain Medications:     - Opioids:  Tramadol  - NSAIDs:  None  - Anti-Depressants:  None  - Anti-Convulsants:  Gabapentin        Physical Therapy/Home Exercise: no        report:  Reviewed and consistent with medication use as prescribed.        Pain Procedures:   6/19/2019- Bilateral L3-4 TF TRIP  7/31/2019- Bilateral L4/5 TF TRIP           Imaging:     EXAMINATION:  XR LUMBAR SPINE 5 VIEW WITH FLEX AND EXT; XR  HIPS BILATERAL 2 VIEW INCL AP PELVIS     CLINICAL HISTORY:  Low back pain, >6wks conservative tx, persistent-progressive sx, surgical candidate;  Atherosclerotic heart disease of native coronary artery without angina pectoris     TECHNIQUE:  Five views of the lumbar spine plus flexion extension views and two views of both hips were performed.     COMPARISON:  None.     FINDINGS:  Mild anterior height loss of T12.     There is multilevel degenerative disc disease, noting disc height loss and endplate changes.  L4-5 and L5-S1 facet arthropathy noted.  SI joints unremarkable.  No subluxation with extension and flexion views.     There is mild joint space narrowing with subchondral sclerosis of the hip joints, suggesting osteoarthritis.     Prominent scattered calcified atherosclerotic disease.     No marrow replacement process.     IMPRESSION:      Mild anterior compression fracture T12, age indeterminate.     Multilevel lumbar spondylosis.     Mild hip degenerative change.        Electronically signed by: Yasmani Fowler MD  Date:                                            05/24/2019  Time:                                           12:50    Allergies: Review of patient's allergies indicates:  No Known Allergies    Current Medications:   Current Outpatient Medications   Medication Sig Dispense Refill    apixaban (ELIQUIS) 5 mg Tab Take 5 mg by mouth 2 (two) times daily.      cephALEXin (KEFLEX) 500 MG capsule Take 500 mg by mouth every 8 (eight) hours.      clopidogrel (PLAVIX) 75 mg tablet clopidogrel 75 mg tablet      eplerenone (INSPRA) 25 MG Tab Take by mouth.      ergocalciferol (ERGOCALCIFEROL) 50,000 unit Cap Take 50,000 Units by mouth every 7 days.      fluticasone propionate (FLONASE) 50 mcg/actuation nasal spray fluticasone propionate 50 mcg/actuation nasal spray,suspension      furosemide (LASIX) 40 MG tablet Take 40 mg by mouth once daily.      gabapentin (NEURONTIN) 300 MG capsule gabapentin 300 mg capsule    TAKE 1 CAPSULE BY MOUTH THREE TIMES A DAY AS NEEDED      glimepiride (AMARYL) 2 MG tablet Take 1 mg by mouth daily with breakfast.      metoprolol tartrate (LOPRESSOR) 25 MG tablet Take 25 mg by mouth once daily.      pravastatin (PRAVACHOL) 40 MG tablet Take 40 mg by mouth once daily.      sacubitriL-valsartan (ENTRESTO) 24-26 mg per tablet Take 1 tablet by mouth Daily.      traMADol (ULTRAM) 50 mg tablet tramadol 50 mg tablet       No current facility-administered medications for this visit.     Facility-Administered Medications Ordered in Other Visits   Medication Dose Route Frequency Provider Last Rate Last Admin    0.9%  NaCl infusion  500 mL Intravenous Continuous Yamilex Schmidt MD           REVIEW OF SYSTEMS:    GENERAL:  No weight loss, malaise or fevers.  HEENT:  Negative for frequent or significant headaches.  NECK:  Negative for lumps, goiter, pain and significant neck swelling.  RESPIRATORY:  Negative for cough, wheezing or shortness of breath.  CARDIOVASCULAR:  Negative for chest pain, leg swelling or palpitations. On Plavix  GI:  Negative for abdominal discomfort, blood in stools or black stools or change in bowel habits.  MUSCULOSKELETAL:  See HPI.  SKIN:  Negative for lesions, rash, and itching.  PSYCH:  Negative for sleep disturbance, mood disorder and recent psychosocial stressors.  HEMATOLOGY/LYMPHOLOGY:  Negative for prolonged bleeding, bruising easily or swollen nodes.  NEURO:   No history of headaches, syncope, paralysis, seizures or tremors.  All other reviewed and negative other than HPI.    Past Medical History:  Past Medical History:   Diagnosis Date    CHF (congestive heart failure)     Diabetes mellitus        Past Surgical History:  Past Surgical History:   Procedure Laterality Date    TRANSFORAMINAL EPIDURAL INJECTION OF STEROID Bilateral 6/19/2019    Procedure: INJECTION, STEROID, EPIDURAL, TRANSFORAMINAL APPROACH, L3-L4 need consent Plavix;  Surgeon: Jason Shell MD;   Location: [facility];  Service: Pain Management;  Laterality: Bilateral;  PLAVIX CLEARANCE SCANNED TO MEDIA    TRANSFORAMINAL EPIDURAL INJECTION OF STEROID Bilateral 7/31/2019    Procedure: INJECTION, STEROID, EPIDURAL, TRANSFORAMINAL APPROACH, ARMINDA L4-L5 Pt. cleared to hold Plavix 7 days and Pletal 3 days;  Surgeon: Jason Shell MD;  Location: [facility]IN [facility];  Service: Pain Management;  Laterality: Bilateral;       Family History:  Family History   Problem Relation Age of Onset    No Known Problems Mother     No Known Problems Father     No Known Problems Sister     Heart disease Brother     Diabetes Brother     No Known Problems Maternal Aunt     No Known Problems Maternal Uncle     No Known Problems Paternal Aunt     No Known Problems Paternal Uncle     No Known Problems Maternal Grandmother     No Known Problems Maternal Grandfather     No Known Problems Paternal Grandmother     No Known Problems Paternal Grandfather     Anemia Neg Hx     Arrhythmia Neg Hx     Asthma Neg Hx     Clotting disorder Neg Hx     Fainting Neg Hx     Heart attack Neg Hx     Heart failure Neg Hx     Hyperlipidemia Neg Hx     Hypertension Neg Hx     Stroke Neg Hx     Atrial Septal Defect Neg Hx        Social History:  Social History     Socioeconomic History    Marital status:    Tobacco Use    Smoking status: Former    Smokeless tobacco: Never    Tobacco comments:     20 years ago   Substance and Sexual Activity    Alcohol use: Yes     Comment: socially    Drug use: Never       OBJECTIVE:    /68   Pulse 86   Wt 91 kg (200 lb 9.9 oz)   BMI 31.42 kg/m²     PHYSICAL EXAMINATION:    General appearance: Well appearing, in no acute distress, alert and oriented x3.  Psych:  Mood and affect appropriate.  Skin: Skin color, texture, turgor normal, no rashes or lesions, in both upper and lower body.  Head/face:  Atraumatic, normocephalic. No palpable lymph nodes  Neck: No pain to palpation over the cervical paraspinous  muscles. Spurling Negative. No pain with neck flexion, extension, or lateral flexion. .  Cor: RRR  Pulm: CTA  GI: Abdomen soft and non-tender.  Back: Straight leg raising in the sitting and supine positions is positive for radicular pain. mild pain to palpation over the spine or costovertebral angles. Limited range of motion without pain reproduction.  Extremities: Peripheral joint ROM is full and pain free without obvious instability or laxity in all four extremities. No deformities, edema, or skin discoloration. Good capillary refill.  Musculoskeletal: Bilateral lower extremity strength is normal and symmetric.  No atrophy or tone abnormalities are noted.  Neuro: Bilateral lower extremity coordination and muscle stretch reflexes are physiologic and symmetric.  Plantar response are downgoing. No loss of sensation is noted.  Gait: Normal.    ASSESSMENT: 82 y.o. year old male with low back pain, consistent with the following     1. DDD (degenerative disc disease), lumbar  Procedure Order to Pain Management    X-Ray Lumbar Spine 5 View      2. Lumbar radiculopathy  Procedure Order to Pain Management    X-Ray Lumbar Spine 5 View            PLAN:     - I have stressed the importance of physical activity and a home exercise plan to help with pain and improve health.  - Patient can continue with medications for now since they are providing benefits, using them appropriately, and without side effects.  - Will obtain Lumbar X-ray  - Will schedule for Bilateral L4/5 TFESI  - RTC 4 weeks after procedure  - If patient does not get good relief from the procedure, we will obtain lumbar MRI.  - Patient will need clearance from cardiology to hold Plavix for 1 week prior to procedure.  - Counseled patient regarding the importance of activity modification, constant sleeping habits, and physical therapy.    The above plan and management options were discussed at length with patient. Patient is in agreement with the above and  verbalized understanding.    [place]   I have personally reviewed the history and exam of this patient and agree with the resident/fellow/NPs note as stated above.    Jason Shell MD    12/15/2022

## 2023-01-20 ENCOUNTER — TELEPHONE (OUTPATIENT)
Dept: PAIN MEDICINE | Facility: CLINIC | Age: 83
End: 2023-01-20
Payer: MEDICARE

## 2023-01-20 NOTE — TELEPHONE ENCOUNTER
"Attempted to contact pt to inform per his cardiologist at  "okay to hold eliquis for 3 days and plavis 5 days prior." No answer. Left a detailed message.  "

## 2023-01-23 ENCOUNTER — TELEPHONE (OUTPATIENT)
Dept: PAIN MEDICINE | Facility: HOSPITAL | Age: 83
End: 2023-01-23
Payer: MEDICARE

## 2023-01-23 NOTE — TELEPHONE ENCOUNTER
Patient states he has been off his plavix and eliquis as directed.     Patient notified of 8:30 am arrival time on Thursday 1/26 and the following:  Please remember:  Check in at the registration desk on the first floor of the hospital. [address] [place] [address]  Please DO NOT eat or drink 8 hours prior to your arrival time for the procedure, if diabetic 6 hours prior. If you are taking medications for blood pressure, heart medications, thyroid, cholesterol; you can take them with a small sip of water.  Refrain from drinking alcohol within 24 hours prior to your procedure  You will need someone to drive you home after procedure. You cannot take taxi, Uber, or Lyft.  If you start feeling sick (fever, chills, or coughing) or start on any antibiotics please contact us at [phone] to reschedule.

## 2023-01-26 ENCOUNTER — HOSPITAL ENCOUNTER (OUTPATIENT)
Facility: HOSPITAL | Age: 83
Discharge: HOME | End: 2023-01-26
Attending: ANESTHESIOLOGY | Admitting: ANESTHESIOLOGY
Payer: MEDICARE

## 2023-01-26 VITALS
TEMPERATURE: 97 F | HEART RATE: 76 BPM | WEIGHT: 199 LBS | SYSTOLIC BLOOD PRESSURE: 122 MMHG | OXYGEN SATURATION: 99 % | BODY MASS INDEX: 29.47 KG/M2 | RESPIRATION RATE: 16 BRPM | DIASTOLIC BLOOD PRESSURE: 84 MMHG | HEIGHT: 69 IN

## 2023-01-26 DIAGNOSIS — M54.16 LUMBAR RADICULOPATHY: Primary | ICD-10-CM

## 2023-01-26 DIAGNOSIS — G89.29 CHRONIC PAIN: ICD-10-CM

## 2023-01-26 DIAGNOSIS — M51.37 DDD (DEGENERATIVE DISC DISEASE), LUMBOSACRAL: ICD-10-CM

## 2023-01-26 LAB — POCT GLUCOSE: 95 MG/DL (ref 70–110)

## 2023-01-26 PROCEDURE — 25000003 PHARM REV CODE 250: Performed by: EMERGENCY MEDICINE

## 2023-01-26 PROCEDURE — 64483 NJX AA&/STRD TFRM EPI L/S 1: CPT | Mod: 50,,, | Performed by: ANESTHESIOLOGY

## 2023-01-26 PROCEDURE — 63600175 PHARM REV CODE 636 W HCPCS: Performed by: ANESTHESIOLOGY

## 2023-01-26 PROCEDURE — 25500020 PHARM REV CODE 255: Performed by: ANESTHESIOLOGY

## 2023-01-26 PROCEDURE — 25000003 PHARM REV CODE 250: Performed by: ANESTHESIOLOGY

## 2023-01-26 PROCEDURE — 64483 PR EPIDURAL INJ, ANES/STEROID, TRANSFORAMINAL, LUMB/SACR, SNGL LEVL: ICD-10-PCS | Mod: 50,,, | Performed by: ANESTHESIOLOGY

## 2023-01-26 PROCEDURE — 64483 NJX AA&/STRD TFRM EPI L/S 1: CPT | Mod: 50 | Performed by: ANESTHESIOLOGY

## 2023-01-26 RX ORDER — FENTANYL CITRATE 50 UG/ML
INJECTION, SOLUTION INTRAMUSCULAR; INTRAVENOUS
Status: DISCONTINUED | OUTPATIENT
Start: 2023-01-26 | End: 2023-01-26 | Stop reason: HOSPADM

## 2023-01-26 RX ORDER — DEXAMETHASONE SODIUM PHOSPHATE 100 MG/10ML
INJECTION INTRAMUSCULAR; INTRAVENOUS
Status: DISCONTINUED | OUTPATIENT
Start: 2023-01-26 | End: 2023-01-26 | Stop reason: HOSPADM

## 2023-01-26 RX ORDER — LIDOCAINE HYDROCHLORIDE 10 MG/ML
INJECTION, SOLUTION EPIDURAL; INFILTRATION; INTRACAUDAL; PERINEURAL
Status: DISCONTINUED | OUTPATIENT
Start: 2023-01-26 | End: 2023-01-26 | Stop reason: HOSPADM

## 2023-01-26 RX ORDER — LIDOCAINE HYDROCHLORIDE 20 MG/ML
INJECTION, SOLUTION EPIDURAL; INFILTRATION; INTRACAUDAL; PERINEURAL
Status: DISCONTINUED | OUTPATIENT
Start: 2023-01-26 | End: 2023-01-26 | Stop reason: HOSPADM

## 2023-01-26 RX ORDER — SODIUM CHLORIDE 9 MG/ML
500 INJECTION, SOLUTION INTRAVENOUS CONTINUOUS
Status: DISCONTINUED | OUTPATIENT
Start: 2023-01-26 | End: 2023-01-26 | Stop reason: HOSPADM

## 2023-01-26 RX ORDER — MIDAZOLAM HYDROCHLORIDE 1 MG/ML
INJECTION, SOLUTION INTRAMUSCULAR; INTRAVENOUS
Status: DISCONTINUED | OUTPATIENT
Start: 2023-01-26 | End: 2023-01-26 | Stop reason: HOSPADM

## 2023-01-26 RX ADMIN — SODIUM CHLORIDE 500 ML: 9 INJECTION, SOLUTION INTRAVENOUS at 08:01

## 2023-01-26 NOTE — DISCHARGE INSTRUCTIONS
Home Care Instructions Pain Management:    1.  DIET:    You may resume your normal diet today.    2.  BATHING:    You may shower with luke warm water.    3.  DRESSING:    You may remove your bandage today.    4.  ACTIVITY LEVEL:      You may resume your normal activities 24 hours after your procedure.    5.  MEDICATIONS:    You may resume your normal medications today.    6.  SPECIAL INSTRUCTIONS:    No heat to the injection site for 24 hours including bath or shower, heating pad, moist heat or hot tubs.    Use an ice pack to the injection site for any pain or discomfort.  Apply ice packs for 20 minute intervals as needed.    If you have received any sedatives by mouth today, you can not drive for 12 hours.    If you have received sedation through an IV, you can not drive for 24 hours.    PLEASE CALL YOUR DOCTOR FOR THE FOLLOWIN.  Redness or swelling around the injection site.  2.  Fever of 101 degrees.  3.  Drainage (pus) from the injection site.  4.  For any continuous bleeding (some dried blood over the incision is normal.)    FOR EMERGENCIES:    If any unusual problems or difficulties occur during clinic hours, call [phone] or dial [phone].    Follow up with with your physician in 2-3 weeks.

## 2023-01-26 NOTE — H&P
"HPI  Patient presenting for Procedure(s) (LRB):  Injection,steroid,epidural,transforaminal Bilateral L4/5 (Bilateral)     Patient on Anti-coagulation Yes    No health changes since previous encounter    Past Medical History:   Diagnosis Date    CHF (congestive heart failure)     Diabetes mellitus      Past Surgical History:   Procedure Laterality Date    TRANSFORAMINAL EPIDURAL INJECTION OF STEROID Bilateral 6/19/2019    Procedure: INJECTION, STEROID, EPIDURAL, TRANSFORAMINAL APPROACH, L3-L4 need consent Plavix;  Surgeon: Jason Shell MD;  Location: [facility]IN [facility];  Service: Pain Management;  Laterality: Bilateral;  PLAVIX CLEARANCE SCANNED TO MEDIA    TRANSFORAMINAL EPIDURAL INJECTION OF STEROID Bilateral 7/31/2019    Procedure: INJECTION, STEROID, EPIDURAL, TRANSFORAMINAL APPROACH, ARMINDA L4-L5 Pt. cleared to hold Plavix 7 days and Pletal 3 days;  Surgeon: Jason Shell MD;  Location: [facility] PAIN [facility]GT;  Service: Pain Management;  Laterality: Bilateral;     Review of patient's allergies indicates:  No Known Allergies   Current Facility-Administered Medications   Medication    0.9%  NaCl infusion    dexAMETHasone injection    iohexoL (OMNIPAQUE 300) injection    LIDOcaine (PF) 10 mg/ml (1%) injection    LIDOcaine (PF) 20 mg/mL (2%) injection     Facility-Administered Medications Ordered in Other Encounters   Medication    0.9%  NaCl infusion       PMHx, PSHx, Allergies, Medications reviewed in epic    ROS negative except pain complaints in HPI    OBJECTIVE:    /67 (BP Location: Right arm, Patient Position: Lying)   Pulse 92   Temp 97.9 °F (36.6 °C) (Temporal)   Resp 18   Ht 5' 9" (1.753 m)   Wt 90.3 kg (199 lb)   SpO2 100%   BMI 29.39 kg/m²     PHYSICAL EXAMINATION:    GENERAL: Well appearing, in no acute distress, alert and oriented x3.  PSYCH:  Mood and affect appropriate.  SKIN: Skin color, texture, turgor normal, no rashes or lesions which will impact the procedure.  CV: RRR with palpation of " the radial artery.  PULM: No evidence of respiratory difficulty, symmetric chest rise. Clear to auscultation.  NEURO: Cranial nerves grossly intact.    Plan:    Proceed with procedure as planned Procedure(s) (LRB):  Injection,steroid,epidural,transforaminal Bilateral L4/5 (Bilateral)    OLGA ARAIZA  01/26/2023

## 2023-01-26 NOTE — OP NOTE
Lumbar Transforaminal Epidural Steroid Injection under Fluoroscopic Guidance    The procedure, risks, benefits, and options were discussed with the patient. There are no contraindications to the procedure. The patent expressed understanding and agreed to the procedure. Informed written consent was obtained prior to the start of the procedure and can be found in the patient's chart.    PATIENT NAME: Will Kan   MRN: [id number]     DATE OF PROCEDURE: 01/26/2023    PROCEDURE:  Bilateral  L4/5 Lumbar Transforaminal Epidural Steroid Injection under Fluoroscopic Guidance    PRE-OP DIAGNOSIS: Lumbar radiculopathy [M54.16]  DDD (degenerative disc disease), lumbar [M51.36] Lumbar radiculopathy [M54.16]    POST-OP DIAGNOSIS: Same    PHYSICIAN: Jason Shell MD      MEDICATIONS INJECTED: Preservative-free Decadron 10mg with 5cc of Lidocaine 1% MPF     LOCAL ANESTHETIC INJECTED: Xylocaine 2%     SEDATION: Versed 1mg and Fentanyl 50mcg                                                                                                                                                                                     Conscious sedation ordered by M.D. Patient re-evaluation prior to administration of conscious sedation. No changes noted in patient's status from initial evaluation. The patient's vital signs were monitored by RN and patient remained hemodynamically stable throughout the procedure.    Event Time In   Sedation Start 1012       ESTIMATED BLOOD LOSS: None    COMPLICATIONS: None    TECHNIQUE: Time-out was performed to identify the patient and procedure to be performed. With the patient laying in a prone position, the surgical area was prepped and draped in the usual sterile fashion using ChloraPrep and a fenestrated drape.The levels were determined under fluoroscopy guidance. Skin anesthesia was achieved by injecting Lidocaine 2% over the injection sites. The transforaminal spaces were then approached with a 22 gauge,  5 inch spinal quinke needle that was introduced under fluoroscopic guidance in the AP and Lateral views. Once the needle tip was in the area of the transforaminal space, and there was no blood, CSF or paraesthesias, contrast dye Omnipaque (240mg/mL) was injected to confirm placement and there was no vascular runoff. Fluoroscopic imaging in the AP and lateral views revealed a clear outline of the spinal nerve with proximal spread of agent through the neural foramen into the epidural space. 3 mL of the medication mixture listed above was injected slowly at each site. Displacement of the radio opaque contrast after injection of the medication confirmed that the medication went into the area of the transforaminal spaces. The needles were removed and bleeding was nil. A sterile dressing was applied. No specimens collected. The patient tolerated the procedure well.       The patient was monitored after the procedure in the recovery area. They were given post-procedure and discharge instructions to follow at home. The patient was discharged in a stable condition.    I reviewed and edited the fellow's note. I conducted my own interview and physical examination. I agree with the findings. I was present and supervising all critical portions of the procedure.    Jason Shell MD

## 2023-01-26 NOTE — DISCHARGE SUMMARY
Discharge Note  Short Stay      SUMMARY     Admit Date: 1/26/2023    Attending Physician: Jason Shell    Discharge Physician: Jason Shell      Discharge Date: 1/26/2023 10:27 AM    Procedure(s) (LRB):  Injection,steroid,epidural,transforaminal Bilateral L4/5 (Bilateral)    Final Diagnosis: Lumbar radiculopathy [M54.16]  DDD (degenerative disc disease), lumbar [M51.36]    Disposition: Home or self care    Patient Instructions:   Current Discharge Medication List        CONTINUE these medications which have NOT CHANGED    Details   eplerenone (INSPRA) 25 MG Tab Take by mouth.      ergocalciferol (ERGOCALCIFEROL) 50,000 unit Cap Take 50,000 Units by mouth every 7 days.      fluticasone propionate (FLONASE) 50 mcg/actuation nasal spray fluticasone propionate 50 mcg/actuation nasal spray,suspension      furosemide (LASIX) 40 MG tablet Take 40 mg by mouth once daily.      gabapentin (NEURONTIN) 300 MG capsule gabapentin 300 mg capsule   TAKE 1 CAPSULE BY MOUTH THREE TIMES A DAY AS NEEDED      glimepiride (AMARYL) 2 MG tablet Take 1 mg by mouth daily with breakfast.      metoprolol tartrate (LOPRESSOR) 25 MG tablet Take 25 mg by mouth once daily.      pravastatin (PRAVACHOL) 40 MG tablet Take 40 mg by mouth once daily.      sacubitriL-valsartan (ENTRESTO) 24-26 mg per tablet Take 1 tablet by mouth Daily.      apixaban (ELIQUIS) 5 mg Tab Take 5 mg by mouth 2 (two) times daily.      cephALEXin (KEFLEX) 500 MG capsule Take 500 mg by mouth every 8 (eight) hours.      clopidogrel (PLAVIX) 75 mg tablet clopidogrel 75 mg tablet      traMADol (ULTRAM) 50 mg tablet tramadol 50 mg tablet                 Discharge Diagnosis: Lumbar radiculopathy [M54.16]  DDD (degenerative disc disease), lumbar [M51.36]  Condition on Discharge: Stable with no complications to procedure   Diet on Discharge: Same as before.  Activity: as per instruction sheet.  Discharge to: Home with a responsible adult.  Follow up: 2-4 weeks       Please  call my office or pager at [phone] if experienced any weakness or loss of sensation, fever > 101.5, pain uncontrolled with oral medications, persistent nausea/vomiting/or diarrhea, redness or drainage from the incisions, or any other worrisome concerns. If physician on call was not reached or could not communicate with our office for any reason please go to the nearest emergency department        Jason Shell

## 2023-01-26 NOTE — PLAN OF CARE
"MD at bedside to assess patient's BP. PLR performed by MD. BP stable at 122/61. Patient ambulated around unit; no weakness in legs. Patient able to exhibit stability upon standing and walking. Patient verbalized "I feel fine, I'm not dizzy or anything." Patient denied any dizziness, weakness, or shortness of breath. Per MD, patient cleared for discharge.  "

## 2023-02-06 ENCOUNTER — PATIENT MESSAGE (OUTPATIENT)
Dept: PAIN MEDICINE | Facility: CLINIC | Age: 83
End: 2023-02-06
Payer: MEDICARE

## 2023-02-16 ENCOUNTER — PATIENT MESSAGE (OUTPATIENT)
Dept: PAIN MEDICINE | Facility: CLINIC | Age: 83
End: 2023-02-16
Payer: MEDICARE

## 2023-02-25 ENCOUNTER — PATIENT MESSAGE (OUTPATIENT)
Dept: PAIN MEDICINE | Facility: CLINIC | Age: 83
End: 2023-02-25
Payer: MEDICARE

## 2023-04-06 ENCOUNTER — OFFICE VISIT (OUTPATIENT)
Dept: PAIN MEDICINE | Facility: CLINIC | Age: 83
End: 2023-04-06
Attending: ANESTHESIOLOGY
Payer: MEDICARE

## 2023-04-06 DIAGNOSIS — M54.17 LUMBOSACRAL RADICULOPATHY: ICD-10-CM

## 2023-04-06 DIAGNOSIS — M54.17 LUMBOSACRAL RADICULOPATHY: Primary | ICD-10-CM

## 2023-04-06 DIAGNOSIS — M51.37 DDD (DEGENERATIVE DISC DISEASE), LUMBOSACRAL: Primary | ICD-10-CM

## 2023-04-06 PROCEDURE — 1159F PR MEDICATION LIST DOCUMENTED IN MEDICAL RECORD: ICD-10-PCS | Mod: CPTII,95,, | Performed by: ANESTHESIOLOGY

## 2023-04-06 PROCEDURE — 99442 PR PHYSICIAN TELEPHONE EVALUATION 11-20 MIN: ICD-10-PCS | Mod: 95,,, | Performed by: ANESTHESIOLOGY

## 2023-04-06 PROCEDURE — 1160F PR REVIEW ALL MEDS BY PRESCRIBER/CLIN PHARMACIST DOCUMENTED: ICD-10-PCS | Mod: CPTII,95,, | Performed by: ANESTHESIOLOGY

## 2023-04-06 PROCEDURE — 99442 PR PHYSICIAN TELEPHONE EVALUATION 11-20 MIN: CPT | Mod: 95,,, | Performed by: ANESTHESIOLOGY

## 2023-04-06 PROCEDURE — 1160F RVW MEDS BY RX/DR IN RCRD: CPT | Mod: CPTII,95,, | Performed by: ANESTHESIOLOGY

## 2023-04-06 PROCEDURE — 1159F MED LIST DOCD IN RCRD: CPT | Mod: CPTII,95,, | Performed by: ANESTHESIOLOGY

## 2023-04-06 NOTE — PROGRESS NOTES
"Established Patient - Audio Only Telehealth Visit     The patient location is: Home  The chief complaint leading to consultation is: Back pain  Visit type: Virtual visit with audio only (telephone)  Total time spent with patient: 15 minutes       The reason for the audio only service rather than synchronous audio and video virtual visit was related to technical difficulties or patient preference/necessity.     Each patient to whom I provide medical services by telemedicine is:  (1) informed of the relationship between the physician and patient and the respective role of any other health care provider with respect to management of the patient; and (2) notified that they may decline to receive medical services by telemedicine and may withdraw from such care at any time. Patient verbally consented to receive this service via voice-only telephone call.       HPI:     Interval History 4/6/23  Presents telemedicine to discuss follow up after bilateral 4/5 TF epidural. Reports 50% relief. Current pain intensity if 5/10. If he wakes up in the middle of the night is when the pain is the worst. Taking gabapentin "as needed" and tramadol.     Interval History 7/7/2020:  Will Kan presents tele-medicine appointment for a follow-up appointment for new hip pain. Since the last visit, Will Kan states the pain has been worsening. Current pain intensity is 7/10.   Patient states his back pain is much better, he wanted to discuss with us this new  hip pain as he been evaluated in injected by orthopedics with no improvement     Interval History 8/12/2019:  The patient returns to clinic today for follow up. He is s/p bilateral L4 TF TRIP on 7/31/2019. He reports 50% relief of his low back and left leg pain. He reports no relief of his right foot pain and numbness. He continues to report right foot pain and numbness that worsens with standing and walking. He is scheduled to see his cardiologist in the next few weeks for an " ultrasound and KIMBERLY. He also reports worsening ankle edema bilaterally. He continues to take Gabapentin with some benefit. He denies any other health changes. His pain today is 5/10.     Interval History 7/3/2019:  The patient returns to clinic today for follow up. He is s/p bilateral L3 TF TRIP on 6/19/2019. He reports significant relief of his back pain. He continues to report pain that radiates into the front and side of his left leg to his knee. He denies any right radicular leg pain. He does report burning, tingling, and numbness to the top of his right foot. His pain is worse with prolonged activity and at night. He continues to take Gabapentin with benefit. He denies any other health changes. His pain today is 5/10.     HPI:  Will Kan presents to the clinic for the evaluation of lower back pain. The pain started 3 years ago following non-related imjury and symptoms have been worsening. More recently patient underwent angiogram in 02/2019 and the back pain got acutely worse after laying flat for the required amount of time. The pain is located in the lower back area and radiates to the bilateral hip, worse on the left. The pain is described as aching and numbing and is rated as 4/10. The pain is rated with a score of  4/10 on the BEST day and a score of 10/10 on the WORST day. Symptoms interfere with activities around the house such as yard work as well as exercise. The pain is exacerbated by bending, walking and getting out of bed/chair. The pain is mitigated by medications and rest. He reports spending 4 hours per day reclining. The patient reports 8 hours of uninterrupted sleep per night.     Patient denies night fever/night sweats, urinary incontinence, bowel incontinence, significant weight loss, significant motor weakness and loss of sensations.     Brief Hx:  Will Kan is a 78 y.o. male w/ a significant PMHx of HTN, HLD, PVD, CAD s/p 3v CABG in 2007, and T2DM. Patient presents to clinic for  lower back pain that radiates down to the hip and is worse on the left side. On the left side pain shoots down the leg and stops at the knee.   Pain Medications:     - Opioids:  None  - NSAIDs:  None  - Anti-Depressants:  None  - Anti-Convulsants:  Gabapentin           Assessment and plan:      - Mr. Kan is an 83 yo M with degenerative disc disease and lumbosacral radiculopathy.      - I have stressed the importance of physical activity and a home exercise plan to help with pain and improve health.  - Patient can continue with medications for now since they are providing benefits, using them appropriately, and without side effects.  - Counseled patient regarding the importance of activity modification.  -Medication options were discussed with patient although he declined medications including gabapentin. Will take tylenol as needed  - Will schedule for epidural steroid injection L4/5  -RTC in 4 weeks after the injection  - Counseled patient regarding the importance of activity modification, constant sleeping habits, and physical therapy.    This service was not originating from a related E/M service provided within the previous 7 days nor will  to an E/M service or procedure within the next 24 hours or my soonest available appointment.  Prevailing standard of care was able to be met in this audio-only visit.      I have personally reviewed the phone encounter with resident/fellow/NP and personally spoke with patient for over 11 min after addressing all questions and concerns     The phone call was initiated by patient who consented and verbalized understanding to the type of encounter not related to any office visit or other encounter in the past 7 days    Jason Shell MD

## 2023-04-10 ENCOUNTER — PATIENT MESSAGE (OUTPATIENT)
Dept: PAIN MEDICINE | Facility: OTHER | Age: 83
End: 2023-04-10
Payer: MEDICARE

## 2024-03-25 ENCOUNTER — OFFICE VISIT (OUTPATIENT)
Dept: URGENT CARE | Facility: CLINIC | Age: 84
End: 2024-03-25
Payer: MEDICARE

## 2024-03-25 VITALS
SYSTOLIC BLOOD PRESSURE: 118 MMHG | RESPIRATION RATE: 16 BRPM | HEIGHT: 69 IN | TEMPERATURE: 99 F | BODY MASS INDEX: 29.47 KG/M2 | OXYGEN SATURATION: 100 % | HEART RATE: 87 BPM | DIASTOLIC BLOOD PRESSURE: 70 MMHG | WEIGHT: 199 LBS

## 2024-03-25 DIAGNOSIS — R05.9 COUGH, UNSPECIFIED TYPE: ICD-10-CM

## 2024-03-25 DIAGNOSIS — J06.9 VIRAL URI WITH COUGH: Primary | ICD-10-CM

## 2024-03-25 LAB
CTP QC/QA: YES
SARS-COV-2 AG RESP QL IA.RAPID: NEGATIVE

## 2024-03-25 PROCEDURE — 99213 OFFICE O/P EST LOW 20 MIN: CPT | Mod: S$GLB,,, | Performed by: SURGERY

## 2024-03-25 PROCEDURE — 87811 SARS-COV-2 COVID19 W/OPTIC: CPT | Mod: QW,S$GLB,, | Performed by: SURGERY

## 2024-03-25 RX ORDER — BENZONATATE 200 MG/1
200 CAPSULE ORAL 3 TIMES DAILY PRN
Qty: 21 CAPSULE | Refills: 0 | Status: SHIPPED | OUTPATIENT
Start: 2024-03-25 | End: 2024-04-01

## 2024-03-25 NOTE — PROGRESS NOTES
"Subjective:      Patient ID: Will Kan is a 83 y.o. male.    Vitals:  height is 5' 9" (1.753 m) and weight is 90.3 kg (199 lb). His oral temperature is 99.1 °F (37.3 °C). His blood pressure is 118/70 and his pulse is 87. His respiration is 16 and oxygen saturation is 100%.     Chief Complaint: Cough    This is a 83 y.o. male who presents today with a chief complaint of runny nose cough that started yesterday. Pt is coughing up mucus.       Cough  This is a new problem. The current episode started yesterday. The problem has been unchanged. The problem occurs constantly. Pertinent negatives include no chest pain, chills, ear congestion, ear pain, fever, headaches, heartburn, hemoptysis, myalgias, nasal congestion, postnasal drip, rash, rhinorrhea, sore throat, shortness of breath, sweats, weight loss or wheezing. Nothing aggravates the symptoms. He has tried nothing for the symptoms. There is no history of asthma, bronchiectasis, bronchitis, COPD, emphysema, environmental allergies or pneumonia.     Constitution: Negative for chills and fever.   HENT:  Negative for ear pain, postnasal drip and sore throat.    Cardiovascular:  Negative for chest pain.   Respiratory:  Positive for cough. Negative for bloody sputum, shortness of breath and wheezing.    Gastrointestinal:  Negative for heartburn.   Musculoskeletal:  Negative for muscle ache.   Skin:  Negative for rash.   Allergic/Immunologic: Negative for environmental allergies.   Neurological:  Negative for headaches.      Objective:     Physical Exam   Constitutional: He is oriented to person, place, and time. He appears well-developed. He is cooperative.  Non-toxic appearance. He does not appear ill. No distress.   HENT:   Head: Normocephalic and atraumatic.   Ears:   Right Ear: Hearing, tympanic membrane, external ear and ear canal normal.   Left Ear: Hearing, tympanic membrane, external ear and ear canal normal.   Nose: Nose normal. No mucosal edema, " rhinorrhea or nasal deformity. No epistaxis. Right sinus exhibits no maxillary sinus tenderness and no frontal sinus tenderness. Left sinus exhibits no maxillary sinus tenderness and no frontal sinus tenderness.   Mouth/Throat: Uvula is midline, oropharynx is clear and moist and mucous membranes are normal. No trismus in the jaw. Normal dentition. No uvula swelling. No oropharyngeal exudate, posterior oropharyngeal edema or posterior oropharyngeal erythema.   Eyes: Conjunctivae and lids are normal. No scleral icterus.   Neck: Trachea normal and phonation normal. Neck supple. No edema present. No erythema present. No neck rigidity present.   Cardiovascular: Normal rate, regular rhythm, normal heart sounds and normal pulses.   Pulmonary/Chest: Effort normal and breath sounds normal. No respiratory distress. He has no decreased breath sounds. He has no rhonchi.   Abdominal: Normal appearance.   Musculoskeletal: Normal range of motion.         General: No deformity. Normal range of motion.   Neurological: He is alert and oriented to person, place, and time. He exhibits normal muscle tone. Coordination normal.   Skin: Skin is warm, dry, intact, not diaphoretic and not pale.   Psychiatric: His speech is normal and behavior is normal. Judgment and thought content normal.   Nursing note and vitals reviewed.      Assessment:     1. Viral URI with cough    2. Cough, unspecified type        Plan:       Viral URI with cough  -     benzonatate (TESSALON) 200 MG capsule; Take 1 capsule (200 mg total) by mouth 3 (three) times daily as needed for Cough.  Dispense: 21 capsule; Refill: 0    Cough, unspecified type  -     SARS Coronavirus 2 Antigen, POCT Manual Read      Results for orders placed or performed in visit on 03/25/24   SARS Coronavirus 2 Antigen, POCT Manual Read   Result Value Ref Range    SARS Coronavirus 2 Antigen Negative Negative     Acceptable Yes

## 2024-07-15 ENCOUNTER — OFFICE VISIT (OUTPATIENT)
Dept: URGENT CARE | Facility: CLINIC | Age: 84
End: 2024-07-15
Payer: MEDICARE

## 2024-07-15 VITALS
BODY MASS INDEX: 29.39 KG/M2 | RESPIRATION RATE: 16 BRPM | DIASTOLIC BLOOD PRESSURE: 69 MMHG | TEMPERATURE: 98 F | SYSTOLIC BLOOD PRESSURE: 108 MMHG | HEART RATE: 87 BPM | OXYGEN SATURATION: 93 % | WEIGHT: 199 LBS

## 2024-07-15 DIAGNOSIS — S41.112A SKIN TEAR OF LEFT UPPER ARM WITHOUT COMPLICATION, INITIAL ENCOUNTER: Primary | ICD-10-CM

## 2024-07-15 PROCEDURE — 99213 OFFICE O/P EST LOW 20 MIN: CPT | Mod: 25,S$GLB,, | Performed by: NURSE PRACTITIONER

## 2024-07-15 PROCEDURE — 90714 TD VACC NO PRESV 7 YRS+ IM: CPT | Mod: S$GLB,,, | Performed by: NURSE PRACTITIONER

## 2024-07-15 PROCEDURE — 90471 IMMUNIZATION ADMIN: CPT | Mod: S$GLB,,, | Performed by: NURSE PRACTITIONER

## 2024-07-15 RX ORDER — MUPIROCIN 20 MG/G
OINTMENT TOPICAL
Qty: 22 G | Refills: 1 | Status: SHIPPED | OUTPATIENT
Start: 2024-07-15

## 2024-07-15 NOTE — PATIENT INSTRUCTIONS
Cleanse area with an antibacterial soap and water.  Pat dry gently.  Apply a thin layer of Mupirocin OR Vaseline/Aquaphor OTC to the area.  Keep area moist and covered.   Follow up closely with your Primary Care for recheck.  Return here or go to the ER as needed for worsening condition or signs of infection.

## 2024-07-15 NOTE — PROGRESS NOTES
Subjective:      Patient ID: Will Kan is a 83 y.o. male.    Vitals:  weight is 90.3 kg (199 lb). His oral temperature is 98.3 °F (36.8 °C). His blood pressure is 108/69 and his pulse is 87. His respiration is 16 and oxygen saturation is 93% (abnormal).     Chief Complaint: Bleeding from Tricep    This is a 83 y.o. male who presents today with a chief complaint of cut on his left tricep that won't stop bleeding. Incident happened this morning at home.    Home Tx: Bandages     PMH: Takes Blood thinners, Hypertension, Diabtetes      Arm Injury   The incident occurred 6 to 12 hours ago. The incident occurred at home. Injury mechanism: Picked at his Skin. Pain location: Left Tricep. The pain does not radiate. The pain is at a severity of 0/10. Pertinent negatives include no chest pain, muscle weakness, numbness or tingling. Nothing aggravates the symptoms.       Cardiovascular:  Negative for chest pain.   Skin:  Negative for erythema.   Neurological:  Negative for numbness.      Objective:     Physical Exam   Constitutional: He is oriented to person, place, and time. He appears well-developed.   HENT:   Head: Normocephalic and atraumatic. Head is without abrasion, without contusion and without laceration.   Ears:   Right Ear: External ear normal.   Left Ear: External ear normal.   Nose: Nose normal.   Mouth/Throat: Oropharynx is clear and moist and mucous membranes are normal.   Eyes: Conjunctivae, EOM and lids are normal. Pupils are equal, round, and reactive to light.   Neck: Trachea normal and phonation normal. Neck supple.   Cardiovascular: Normal rate, regular rhythm and normal heart sounds.   Pulmonary/Chest: Effort normal and breath sounds normal. No stridor. No respiratory distress.   Musculoskeletal: Normal range of motion.         General: Normal range of motion.   Neurological: He is alert and oriented to person, place, and time.   Skin: Skin is warm, dry and no rash. Capillary refill takes less than 2  seconds. abrasion (there are two small superficial bleeding skin tears to left upper arm) and bruising No burn, No erythema and No ecchymosis   Psychiatric: His speech is normal and behavior is normal. Judgment and thought content normal.   Nursing note and vitals reviewed.      Assessment:     1. Skin tear of left upper arm without complication, initial encounter        Plan:   Wound cleansed with saline.  Mupirocin and non stick placed.  Wrapped with non stick and tolerated well with no active bleeding.  Dressing clean and intact.  Here with his wife in clinic.    Skin tear of left upper arm without complication, initial encounter  -     mupirocin (BACTROBAN) 2 % ointment; Apply to affected area 1-2 times daily for 5 days  Dispense: 22 g; Refill: 1  -     (In Office Administered) Td Vaccine - Preservative Free      Patient Instructions   Cleanse area with an antibacterial soap and water.  Pat dry gently.  Apply a thin layer of Mupirocin OR Vaseline/Aquaphor OTC to the area.  Keep area moist and covered.   Follow up closely with your Primary Care for recheck.  Return here or go to the ER as needed for worsening condition or signs of infection.

## (undated) DEVICE — DRESSING LEUKOPLAST FLEX 1X3IN

## (undated) DEVICE — BANDAGE ADHESIVE